# Patient Record
Sex: MALE | Race: WHITE | NOT HISPANIC OR LATINO | Employment: OTHER | URBAN - METROPOLITAN AREA
[De-identification: names, ages, dates, MRNs, and addresses within clinical notes are randomized per-mention and may not be internally consistent; named-entity substitution may affect disease eponyms.]

---

## 2018-11-15 ENCOUNTER — HOSPITAL ENCOUNTER (OUTPATIENT)
Facility: MEDICAL CENTER | Age: 83
End: 2018-11-15
Attending: NEUROLOGICAL SURGERY | Admitting: NEUROLOGICAL SURGERY
Payer: COMMERCIAL

## 2018-11-30 ENCOUNTER — HOSPITAL ENCOUNTER (OUTPATIENT)
Dept: RADIOLOGY | Facility: MEDICAL CENTER | Age: 83
End: 2018-11-30
Attending: NEUROLOGICAL SURGERY
Payer: COMMERCIAL

## 2018-11-30 DIAGNOSIS — Z01.810 PRE-OPERATIVE CARDIOVASCULAR EXAMINATION: ICD-10-CM

## 2018-11-30 DIAGNOSIS — Z01.811 PRE-OPERATIVE RESPIRATORY EXAMINATION: ICD-10-CM

## 2018-11-30 DIAGNOSIS — Z01.812 PRE-OPERATIVE LABORATORY EXAMINATION: ICD-10-CM

## 2018-11-30 LAB
ANION GAP SERPL CALC-SCNC: 7 MMOL/L (ref 0–11.9)
APPEARANCE UR: CLEAR
APTT PPP: 32.3 SEC (ref 24.7–36)
BASOPHILS # BLD AUTO: 0.9 % (ref 0–1.8)
BASOPHILS # BLD: 0.06 K/UL (ref 0–0.12)
BILIRUB UR QL STRIP.AUTO: NEGATIVE
BUN SERPL-MCNC: 28 MG/DL (ref 8–22)
CALCIUM SERPL-MCNC: 9.5 MG/DL (ref 8.5–10.5)
CHLORIDE SERPL-SCNC: 106 MMOL/L (ref 96–112)
CO2 SERPL-SCNC: 26 MMOL/L (ref 20–33)
COLOR UR: YELLOW
CREAT SERPL-MCNC: 1.56 MG/DL (ref 0.5–1.4)
EKG IMPRESSION: NORMAL
EOSINOPHIL # BLD AUTO: 0.26 K/UL (ref 0–0.51)
EOSINOPHIL NFR BLD: 4 % (ref 0–6.9)
ERYTHROCYTE [DISTWIDTH] IN BLOOD BY AUTOMATED COUNT: 51.2 FL (ref 35.9–50)
GLUCOSE SERPL-MCNC: 91 MG/DL (ref 65–99)
GLUCOSE UR STRIP.AUTO-MCNC: NEGATIVE MG/DL
HCT VFR BLD AUTO: 38.7 % (ref 42–52)
HGB BLD-MCNC: 12.9 G/DL (ref 14–18)
IMM GRANULOCYTES # BLD AUTO: 0.01 K/UL (ref 0–0.11)
IMM GRANULOCYTES NFR BLD AUTO: 0.2 % (ref 0–0.9)
INR PPP: 1.04 (ref 0.87–1.13)
KETONES UR STRIP.AUTO-MCNC: NEGATIVE MG/DL
LEUKOCYTE ESTERASE UR QL STRIP.AUTO: NEGATIVE
LYMPHOCYTES # BLD AUTO: 1.31 K/UL (ref 1–4.8)
LYMPHOCYTES NFR BLD: 20.4 % (ref 22–41)
MCH RBC QN AUTO: 32.3 PG (ref 27–33)
MCHC RBC AUTO-ENTMCNC: 33.3 G/DL (ref 33.7–35.3)
MCV RBC AUTO: 97 FL (ref 81.4–97.8)
MICRO URNS: NORMAL
MONOCYTES # BLD AUTO: 0.59 K/UL (ref 0–0.85)
MONOCYTES NFR BLD AUTO: 9.2 % (ref 0–13.4)
NEUTROPHILS # BLD AUTO: 4.19 K/UL (ref 1.82–7.42)
NEUTROPHILS NFR BLD: 65.3 % (ref 44–72)
NITRITE UR QL STRIP.AUTO: NEGATIVE
NRBC # BLD AUTO: 0 K/UL
NRBC BLD-RTO: 0 /100 WBC
PH UR STRIP.AUTO: 5.5 [PH]
PLATELET # BLD AUTO: 202 K/UL (ref 164–446)
PMV BLD AUTO: 9.6 FL (ref 9–12.9)
POTASSIUM SERPL-SCNC: 4.1 MMOL/L (ref 3.6–5.5)
PROT UR QL STRIP: NEGATIVE MG/DL
PROTHROMBIN TIME: 13.8 SEC (ref 12–14.6)
RBC # BLD AUTO: 3.99 M/UL (ref 4.7–6.1)
RBC UR QL AUTO: NEGATIVE
SODIUM SERPL-SCNC: 139 MMOL/L (ref 135–145)
SP GR UR STRIP.AUTO: 1.01
UROBILINOGEN UR STRIP.AUTO-MCNC: 0.2 MG/DL
WBC # BLD AUTO: 6.4 K/UL (ref 4.8–10.8)

## 2018-11-30 PROCEDURE — 81003 URINALYSIS AUTO W/O SCOPE: CPT

## 2018-11-30 PROCEDURE — 93005 ELECTROCARDIOGRAM TRACING: CPT

## 2018-11-30 PROCEDURE — 80048 BASIC METABOLIC PNL TOTAL CA: CPT

## 2018-11-30 PROCEDURE — 85730 THROMBOPLASTIN TIME PARTIAL: CPT

## 2018-11-30 PROCEDURE — 93010 ELECTROCARDIOGRAM REPORT: CPT | Performed by: INTERNAL MEDICINE

## 2018-11-30 PROCEDURE — 36415 COLL VENOUS BLD VENIPUNCTURE: CPT

## 2018-11-30 PROCEDURE — 85610 PROTHROMBIN TIME: CPT

## 2018-11-30 PROCEDURE — 85025 COMPLETE CBC W/AUTO DIFF WBC: CPT

## 2018-11-30 PROCEDURE — 71045 X-RAY EXAM CHEST 1 VIEW: CPT

## 2018-11-30 RX ORDER — HYDROCHLOROTHIAZIDE 12.5 MG/1
12.5 CAPSULE, GELATIN COATED ORAL DAILY
COMMUNITY
End: 2018-12-04 | Stop reason: HOSPADM

## 2018-12-06 ENCOUNTER — HOSPITAL ENCOUNTER (OUTPATIENT)
Dept: RADIOLOGY | Facility: MEDICAL CENTER | Age: 83
End: 2018-12-06

## 2018-12-14 ENCOUNTER — APPOINTMENT (OUTPATIENT)
Dept: RADIOLOGY | Facility: MEDICAL CENTER | Age: 83
End: 2018-12-14
Attending: INTERNAL MEDICINE
Payer: COMMERCIAL

## 2018-12-14 ENCOUNTER — HOSPITAL ENCOUNTER (OUTPATIENT)
Facility: MEDICAL CENTER | Age: 83
End: 2018-12-14
Attending: INTERNAL MEDICINE | Admitting: INTERNAL MEDICINE
Payer: COMMERCIAL

## 2018-12-14 ENCOUNTER — APPOINTMENT (OUTPATIENT)
Dept: RADIOLOGY | Facility: MEDICAL CENTER | Age: 83
End: 2018-12-14
Attending: RADIOLOGY
Payer: COMMERCIAL

## 2018-12-14 VITALS
BODY MASS INDEX: 24.91 KG/M2 | OXYGEN SATURATION: 96 % | WEIGHT: 168.21 LBS | RESPIRATION RATE: 14 BRPM | DIASTOLIC BLOOD PRESSURE: 73 MMHG | HEIGHT: 69 IN | SYSTOLIC BLOOD PRESSURE: 141 MMHG | HEART RATE: 60 BPM | TEMPERATURE: 97.4 F

## 2018-12-14 DIAGNOSIS — C34.11 MALIGNANT NEOPLASM OF UPPER LOBE OF RIGHT LUNG (HCC): ICD-10-CM

## 2018-12-14 LAB — PATHOLOGY CONSULT NOTE: NORMAL

## 2018-12-14 PROCEDURE — 71045 X-RAY EXAM CHEST 1 VIEW: CPT

## 2018-12-14 PROCEDURE — 88305 TISSUE EXAM BY PATHOLOGIST: CPT

## 2018-12-14 PROCEDURE — 160002 HCHG RECOVERY MINUTES (STAT)

## 2018-12-14 PROCEDURE — 700111 HCHG RX REV CODE 636 W/ 250 OVERRIDE (IP)

## 2018-12-14 PROCEDURE — 700101 HCHG RX REV CODE 250

## 2018-12-14 PROCEDURE — 99153 MOD SED SAME PHYS/QHP EA: CPT

## 2018-12-14 RX ORDER — MIDAZOLAM HYDROCHLORIDE 1 MG/ML
.5-2 INJECTION INTRAMUSCULAR; INTRAVENOUS PRN
Status: ACTIVE | OUTPATIENT
Start: 2018-12-14 | End: 2018-12-14

## 2018-12-14 RX ORDER — ONDANSETRON 2 MG/ML
4 INJECTION INTRAMUSCULAR; INTRAVENOUS PRN
Status: DISCONTINUED | OUTPATIENT
Start: 2018-12-14 | End: 2018-12-14 | Stop reason: HOSPADM

## 2018-12-14 RX ORDER — SODIUM CHLORIDE 9 MG/ML
500 INJECTION, SOLUTION INTRAVENOUS
Status: DISCONTINUED | OUTPATIENT
Start: 2018-12-14 | End: 2018-12-14 | Stop reason: HOSPADM

## 2018-12-14 RX ORDER — NALOXONE HYDROCHLORIDE 0.4 MG/ML
INJECTION, SOLUTION INTRAMUSCULAR; INTRAVENOUS; SUBCUTANEOUS
Status: COMPLETED
Start: 2018-12-14 | End: 2018-12-14

## 2018-12-14 RX ORDER — SODIUM CHLORIDE 9 MG/ML
500 INJECTION, SOLUTION INTRAVENOUS
Status: ACTIVE | OUTPATIENT
Start: 2018-12-14 | End: 2018-12-14

## 2018-12-14 RX ORDER — MIDAZOLAM HYDROCHLORIDE 1 MG/ML
.5-2 INJECTION INTRAMUSCULAR; INTRAVENOUS PRN
Status: DISCONTINUED | OUTPATIENT
Start: 2018-12-14 | End: 2018-12-14 | Stop reason: HOSPADM

## 2018-12-14 RX ORDER — ONDANSETRON 2 MG/ML
4 INJECTION INTRAMUSCULAR; INTRAVENOUS PRN
Status: ACTIVE | OUTPATIENT
Start: 2018-12-14 | End: 2018-12-14

## 2018-12-14 RX ORDER — MIDAZOLAM HYDROCHLORIDE 1 MG/ML
INJECTION INTRAMUSCULAR; INTRAVENOUS
Status: COMPLETED
Start: 2018-12-14 | End: 2018-12-14

## 2018-12-14 RX ADMIN — FENTANYL CITRATE 50 MCG: 50 INJECTION, SOLUTION INTRAMUSCULAR; INTRAVENOUS at 12:08

## 2018-12-14 RX ADMIN — LIDOCAINE HYDROCHLORIDE: 10 INJECTION, SOLUTION EPIDURAL; INFILTRATION; INTRACAUDAL; PERINEURAL at 12:09

## 2018-12-14 RX ADMIN — MIDAZOLAM 1 MG: 1 INJECTION INTRAMUSCULAR; INTRAVENOUS at 12:08

## 2018-12-14 RX ADMIN — MIDAZOLAM HYDROCHLORIDE 1 MG: 1 INJECTION INTRAMUSCULAR; INTRAVENOUS at 12:08

## 2018-12-14 ASSESSMENT — PAIN SCALES - GENERAL
PAINLEVEL_OUTOF10: 0

## 2018-12-14 NOTE — PROGRESS NOTES
History and Physical    Date: 12/14/2018    PCP: Sydney Partida M.D.      CC: Lung mass, hx lung ca    HPI: This is a 86 y.o. male who is presenting For CT guided R lung biopsy.     Past Medical History:   Diagnosis Date   • Arthritis     hips   • Cancer (HCC) 2015    lung- radiation and surgery   • Dental disorder     full dentures   • Hypertension    • Pain     back, hips, legs   • Pneumonia 2017   • Renal disorder     CKD stage 3   • Unspecified cataract     bilateral IOL       Past Surgical History:   Procedure Laterality Date   • THORACOSCOPY Right 7/22/2015    Procedure: THORACOSCOPY WEDGE RESECTION UPPER LOBE LUNG NODULE, ADHESION OF LYSIS;  Surgeon: Yobany Guerra M.D.;  Location: SURGERY [facility];  Service:    • THORACOTOMY Right 7/22/2015    Procedure: THORACOTOMY With Wedge Resection upper Lobe Lung Nodule, Adhesion of Lysis;  Surgeon: Yobany Guerra M.D.;  Location: SURGERY [facility];  Service:    • OTHER      mayco cataracts       Current Facility-Administered Medications   Medication Dose Route Frequency Provider Last Rate Last Dose   • LIDOCAINE HCL 1% (S-ORDERABLE)                 Social History     Social History   • Marital status:      Spouse name: N/A   • Number of children: N/A   • Years of education: N/A     Occupational History   • Not on file.     Social History Main Topics   • Smoking status: Current Every Day Smoker     Packs/day: 0.25     Years: 60.00     Types: Cigarettes     Last attempt to quit: 6/22/2015   • Smokeless tobacco: Never Used   • Alcohol use No   • Drug use: No   • Sexual activity: Not on file     Other Topics Concern   • Not on file     Social History Narrative   • No narrative on file       History reviewed. No pertinent family history.    Allergies:  Patient has no known allergies.    Review of Systems:  Negative     Physical Exam    Vital Signs  Blood Pressure : 135/60   Temperature: 36.6 °C (97.8 °F)   Pulse: 64   Respiration: 18    Pulse Oximetry: 100 %        Labs:                    Radiology:  CT-NEEDLE BX-LUNG-MEDIASTINUM RIGHT    (Results Pending)             Assessment and Plan:This is a 86 y.o. Male with R lung mass and hx of lung cancer. CT guided R lung biopsy will be performed.

## 2018-12-14 NOTE — DISCHARGE INSTRUCTIONS
ACTIVITY: Rest and take it easy for the first 24 hours.  A responsible adult is recommended to remain with you during that time.  It is normal to feel sleepy.  We encourage you to not do anything that requires balance, judgment or coordination.    MILD FLU-LIKE SYMPTOMS ARE NORMAL. YOU MAY EXPERIENCE GENERALIZED MUSCLE ACHES, THROAT IRRITATION, HEADACHE AND/OR SOME NAUSEA.    FOR 24 HOURS DO NOT:  Drive, operate machinery or run household appliances.  Drink beer or alcoholic beverages.   Make important decisions or sign legal documents.      DIET: To avoid nausea, slowly advance diet as tolerated, avoiding spicy or greasy foods for the first day.  Add more substantial food to your diet according to your physician's instructions.  Babies can be fed formula or breast milk as soon as they are hungry.  INCREASE FLUIDS AND FIBER TO AVOID CONSTIPATION.    SURGICAL DRESSING/BATHING:       Keep the dressing clean and dry for 2 days.  May remove dressing in 2 days  and can shower.  Do not submerge site under water for 1 week.  No heavy lifting and limit movement for 2 days.        FOLLOW-UP APPOINTMENT:  A follow-up appointment should be arranged with your doctor ; call to schedule.    You should CALL YOUR PHYSICIAN if you develop:  Fever greater than 101 degrees F.  Pain not relieved by medication, or persistent nausea or vomiting.  Excessive bleeding (blood soaking through dressing) or unexpected drainage from the wound.  Extreme redness or swelling around the incision site, drainage of pus or foul smelling drainage.  Inability to urinate or empty your bladder within 8 hours.  Problems with breathing or chest pain.    You should call 911 if you develop problems with breathing or chest pain.  If you are unable to contact your doctor or surgical center, you should go to the nearest emergency room or urgent care center.      Physician's telephone #: [phone]    If any questions arise, call your doctor.  If your doctor is  not available, please feel free to call the Surgical Center at [phone].  The Center is open Monday through Friday from 7AM to 7PM.  You can also call the HEALTH HOTLINE open 24 hours/day, 7 days/week and speak to a nurse at [phone], or toll free at [phone].    A registered nurse may call you a few days after your surgery to see how you are doing after your procedure.    MEDICATIONS: Resume taking daily medication.  Take prescribed pain medication with food.  If no medication is prescribed, you may take non-aspirin pain medication if needed.  PAIN MEDICATION CAN BE VERY CONSTIPATING.  Take a stool softener or laxative such as senokot, pericolace, or milk of magnesia if needed.      If your physician has prescribed pain medication that includes Acetaminophen (Tylenol), do not take additional Acetaminophen (Tylenol) while taking the prescribed medication.    Depression / Suicide Risk    As you are discharged from this [facility] facility, it is important to learn how to keep safe from harming yourself.    Recognize the warning signs:  · Abrupt changes in personality, positive or negative- including increase in energy   · Giving away possessions  · Change in eating patterns- significant weight changes-  positive or negative  · Change in sleeping patterns- unable to sleep or sleeping all the time   · Unwillingness or inability to communicate  · Depression  · Unusual sadness, discouragement and loneliness  · Talk of wanting to die  · Neglect of personal appearance   · Rebelliousness- reckless behavior  · Withdrawal from people/activities they love  · Confusion- inability to concentrate     If you or a loved one observes any of these behaviors or has concerns about self-harm, here's what you can do:  · Talk about it- your feelings and reasons for harming yourself  · Remove any means that you might use to hurt yourself (examples: pills, rope, extension cords, firearm)  · Get professional help from the  community (Mental Health, Substance Abuse, psychological counseling)  · Do not be alone:Call your Safe Contact- someone whom you trust who will be there for you.  · Call your local CRISIS HOTLINE 962-1638 or 481-514-4088  · Call your local Children's Mobile Crisis Response Team [place] (356) 761-5504 or www.ShopYourWorld  · Call the toll free National Suicide Prevention Hotlines   · National Suicide Prevention Lifeline 079-165-DICI (2168)  · [facility] Line Network 800-SUICIDE (304-3717)                      Lung Biopsy    A lung biopsy is a procedure in which a tissue sample is removed from the lung. The tissue can be examined under a microscope to help diagnose various lung disorders.   LET YOUR HEALTH CARE PROVIDER KNOW ABOUT:  · Any allergies you have.  · All medicines you are taking, including vitamins, herbs, eye drops, creams, and over-the-counter medicines.  · Previous problems you or members of your family have had with the use of anesthetics.  · Any blood disorders or bleeding problems that you have.  · Previous surgeries you have had.  · Medical conditions you have.  RISKS AND COMPLICATIONS  Generally, a lung biopsy is a safe procedure. However, problems can occur and include:  · Collapse of the lung.    · Bleeding.    · Infection.    BEFORE THE PROCEDURE  · Do not eat or drink anything after midnight on the night before the procedure or as directed by your health care provider.  · Ask your health care provider about changing or stopping your regular medicines. This is especially important if you are taking diabetes medicines or blood thinners.  · Plan to have someone take you home after the procedure.  PROCEDURE  Various methods can be used to perform a lung biopsy:   · Needle biopsy. A biopsy needle is inserted into the lung. The needle is used to collect the tissue sample. A CT scanner may be used to guide the needle to the right place in the lung. For this method, a medicine is used to  numb the area where the biopsy sample will be taken (local anesthetic).  · Bronchoscopy. A flexible tube (bronchoscope) is inserted into your lungs by going through your mouth or nose. A needle or forceps is passed through the bronchoscope to remove the tissue sample. For this method, medicine may be used to numb the back of your throat.  · Open biopsy. A cut (incision) is made in your chest. The tissue sample is then removed using surgical tools. The incision is closed with skin glue, skin adhesive strips, or stitches. For this method, you will be given medicine to make you sleep through the procedure (general anesthetic).  AFTER THE PROCEDURE  · Your recovery will be assessed and monitored.  · You might have soreness and tenderness at the site of the biopsy for a few days after the procedure.  · You might have a cough and some soreness in your throat for a few days if a bronchoscope was used.  This information is not intended to replace advice given to you by your health care provider. Make sure you discuss any questions you have with your health care provider.  Document Released: 03/08/2006 Document Revised: 01/08/2016 Document Reviewed: 06/01/2014  Elsevier Interactive Patient Education © 2017 [employer]

## 2018-12-14 NOTE — PROGRESS NOTES
CT Nursing Note:    Site Marked and Confirmed with MD, patient and RN pre procedure. Dr. Oneil performed Right Lung Biopsy.  Cores x 4 in formalin taken to pathology by RT Jose Raul.  The pt tolerated the procedure well; ETCo2 baseline 31, with consistent waveform during the procedure.  Gauze and tegaderm applied to posterior right back, CDI and soft.  Pt alert and verbally appropriate post procedure, vital signs stable during procedure and transport, see flow sheet for vital signs.  Report given to ALTON Busby.  RN transported pt to PPU with Sao2 monitor.      Procedure End Time: 1221    Sedation End Time: 1225

## 2018-12-14 NOTE — OR NURSING
1251: [facility]. Patient is awake VSS. R posterior upper back  site soft and dressing clean, dry and intact. No c/o pain or nausea at this time. Will monitor closely. Vital signs per MD order. Patient's respiration spontaneous and non-labored.   1410: 1st CXR done at bedside. Wife at bedside. VSS.   1435: 1st CXR : no pneumothorax. Patient ambulated to and from the bathroom without a problem.   1540: VSS. Biopsy site intact.   1615: 2ND CXR done at bedside.

## 2018-12-14 NOTE — OR SURGEON
Immediate Post- Operative Note        PostOp Diagnosis: R lung mass. Hx of lung cancer.       Procedure(s): CT guided lung biopsy.      Estimated Blood Loss: Less than 5 ml        Complications: None            12/14/2018     1225 PM     Ari Oneil

## 2018-12-15 NOTE — OR NURSING
1715: DC instructions given. Questions answered. Wife at bedside. G tube site soft,CDI. No c/o pain or nausea. Patient wide awake. VSS. Patient met criteria for discharge.

## 2019-12-03 ENCOUNTER — HOSPITAL ENCOUNTER (EMERGENCY)
Dept: HOSPITAL 8 - ED | Age: 84
Discharge: HOME | End: 2019-12-03
Payer: MEDICARE

## 2019-12-03 VITALS — SYSTOLIC BLOOD PRESSURE: 120 MMHG | DIASTOLIC BLOOD PRESSURE: 78 MMHG

## 2019-12-03 VITALS — BODY MASS INDEX: 20.06 KG/M2 | HEIGHT: 71 IN | WEIGHT: 143.3 LBS

## 2019-12-03 DIAGNOSIS — N30.00: Primary | ICD-10-CM

## 2019-12-03 DIAGNOSIS — Z85.118: ICD-10-CM

## 2019-12-03 DIAGNOSIS — R07.89: ICD-10-CM

## 2019-12-03 DIAGNOSIS — R33.9: ICD-10-CM

## 2019-12-03 LAB
ALBUMIN SERPL-MCNC: 2.5 G/DL (ref 3.4–5)
ALP SERPL-CCNC: 80 U/L (ref 45–117)
ALT SERPL-CCNC: 21 U/L (ref 12–78)
ANION GAP SERPL CALC-SCNC: 6 MMOL/L (ref 5–15)
BASOPHILS # BLD AUTO: 0.06 X10^3/UL (ref 0–0.1)
BASOPHILS NFR BLD AUTO: 1 % (ref 0–1)
BILIRUB SERPL-MCNC: 0.5 MG/DL (ref 0.2–1)
CALCIUM SERPL-MCNC: 9.3 MG/DL (ref 8.5–10.1)
CHLORIDE SERPL-SCNC: 111 MMOL/L (ref 98–107)
CREAT SERPL-MCNC: 1.92 MG/DL (ref 0.7–1.3)
CULTURE INDICATED?: YES
EOSINOPHIL # BLD AUTO: 0.16 X10^3/UL (ref 0–0.4)
EOSINOPHIL NFR BLD AUTO: 3 % (ref 1–7)
ERYTHROCYTE [DISTWIDTH] IN BLOOD BY AUTOMATED COUNT: 15.4 % (ref 9.4–14.8)
LYMPHOCYTES # BLD AUTO: 0.77 X10^3/UL (ref 1–3.4)
LYMPHOCYTES NFR BLD AUTO: 15 % (ref 22–44)
MCH RBC QN AUTO: 32 PG (ref 27.5–34.5)
MCHC RBC AUTO-ENTMCNC: 33.3 G/DL (ref 33.2–36.2)
MCV RBC AUTO: 96.2 FL (ref 81–97)
MD: NO
MICROSCOPIC: (no result)
MONOCYTES # BLD AUTO: 0.29 X10^3/UL (ref 0.2–0.8)
MONOCYTES NFR BLD AUTO: 6 % (ref 2–9)
NEUTROPHILS # BLD AUTO: 4.03 X10^3/UL (ref 1.8–6.8)
NEUTROPHILS NFR BLD AUTO: 76 % (ref 42–75)
PLATELET # BLD AUTO: 304 X10^3/UL (ref 130–400)
PMV BLD AUTO: 7.7 FL (ref 7.4–10.4)
PROT SERPL-MCNC: 7.3 G/DL (ref 6.4–8.2)
RBC # BLD AUTO: 3.6 X10^6/UL (ref 4.38–5.82)
TROPONIN I SERPL-MCNC: < 0.015 NG/ML (ref 0–0.04)

## 2019-12-03 PROCEDURE — 70450 CT HEAD/BRAIN W/O DYE: CPT

## 2019-12-03 PROCEDURE — 85025 COMPLETE CBC W/AUTO DIFF WBC: CPT

## 2019-12-03 PROCEDURE — 36415 COLL VENOUS BLD VENIPUNCTURE: CPT

## 2019-12-03 PROCEDURE — 81001 URINALYSIS AUTO W/SCOPE: CPT

## 2019-12-03 PROCEDURE — 51702 INSERT TEMP BLADDER CATH: CPT

## 2019-12-03 PROCEDURE — 87086 URINE CULTURE/COLONY COUNT: CPT

## 2019-12-03 PROCEDURE — 71046 X-RAY EXAM CHEST 2 VIEWS: CPT

## 2019-12-03 PROCEDURE — 99284 EMERGENCY DEPT VISIT MOD MDM: CPT

## 2019-12-03 PROCEDURE — 93005 ELECTROCARDIOGRAM TRACING: CPT

## 2019-12-03 PROCEDURE — 72125 CT NECK SPINE W/O DYE: CPT

## 2019-12-03 PROCEDURE — 84484 ASSAY OF TROPONIN QUANT: CPT

## 2019-12-03 PROCEDURE — 74176 CT ABD & PELVIS W/O CONTRAST: CPT

## 2019-12-03 PROCEDURE — 80053 COMPREHEN METABOLIC PANEL: CPT

## 2019-12-03 NOTE — NUR
-------------------------------------------------------------------------------

          *** Note davinone in EDM - 12/03/19 at 1826 by ANISH ***           

-------------------------------------------------------------------------------

PT. REMAINS MONITORED WITH SEIZURE PRECAUTIONS IN PLACE.  PT. FINISHED HIS 
SNACK AND HIS REPEAT BDK=[id number].  PT. HAS NO CONCERNS AT THIS TIME.  VSS.

## 2019-12-03 NOTE — NUR
PT.'S EASTMAN CATH WAS CHANGED TO A LEG BAG. VSS.  PT. AND HIS FAMILY WERE GIVEN 
DISCHARGE INSTRUCTIONS AND A SCRIPT.  UNDERSTANDING WAS VERBALIZED ALONG WITH 
WILLINGNESS TO COMPLY.  PT.'S IV WAS DCD', CATH TIP INTACT PRESSURE HELD WITH 
HEMOSTASIS ACHIEVED.  PT. WAS ASSISTED WITH DRESSING AND TAKEN TO THE DISCHARGE 
DESK IN A WHEELCHAIR WITH HIS FAMILY AT HIS SIDE.

## 2019-12-03 NOTE — NUR
PT. IS A & O X 4 WITH C/O INCREASING FALLS AND WEAKNESS OVER THE PAST 5 DAYS.  
PT. IS PINK, WARM AND DRY.  LUNGS ARE DIMINISHED IN THE BASES.  PCXR WAS DONE 
AND NEW ABNORMALITIES WERE FOUND.  PT. FELL AND HIT HIS RIBS, NO FRACTURES 
NOTED.  PT. DOES HAVE A HX OF LUNGS CA.  RESP ARE EUPNEIC AND SATS % ON 
ROOM AIR.  PT. DENIES C/O PAIN AT THIS TIME.  PT. STATES HE DOES HAVE URINARY 
RETENTION.  PT.'S ABD. IS SOFT AND ROUND WITH BS + X 4 QUADS.  MM ARE PINK AND 
MOIST WITH PULSES +2 THROUGHOUT.  PT. DIMAS WNL.   LES DIEGO PLACED A EASTMAN CATH 
PER MD ORDERS.  PT. HAS THE CP MONITOR IN PLACE AND IV ACCESS WAS ESTABLISHED.  
SIDERAILS REMAIN UP X 2 WITH THE CALL LIGHT IN PLACE.  PT. WIFE IS AT THE 
BEDSIDE.

## 2020-08-07 ENCOUNTER — HOSPITAL ENCOUNTER (OUTPATIENT)
Dept: HOSPITAL 8 - STAR | Age: 85
Discharge: HOME | End: 2020-08-07
Attending: NEUROLOGICAL SURGERY
Payer: MEDICARE

## 2020-08-07 DIAGNOSIS — M48.062: ICD-10-CM

## 2020-08-07 DIAGNOSIS — I45.10: ICD-10-CM

## 2020-08-07 DIAGNOSIS — Z01.818: Primary | ICD-10-CM

## 2020-08-07 DIAGNOSIS — M47.816: ICD-10-CM

## 2020-08-07 LAB
ALBUMIN SERPL-MCNC: 3.3 G/DL (ref 3.4–5)
ALP SERPL-CCNC: 91 U/L (ref 45–117)
ALT SERPL-CCNC: 12 U/L (ref 12–78)
ANION GAP SERPL CALC-SCNC: 4 MMOL/L (ref 5–15)
APTT BLD: 28 SECONDS (ref 25–31)
BASOPHILS # BLD AUTO: 0.06 X10^3/UL (ref 0–0.1)
BASOPHILS NFR BLD AUTO: 1 % (ref 0–1)
BILIRUB SERPL-MCNC: 0.4 MG/DL (ref 0.2–1)
CALCIUM SERPL-MCNC: 8.8 MG/DL (ref 8.5–10.1)
CHLORIDE SERPL-SCNC: 110 MMOL/L (ref 98–107)
CREAT SERPL-MCNC: 1.69 MG/DL (ref 0.7–1.3)
EOSINOPHIL # BLD AUTO: 0.21 X10^3/UL (ref 0–0.4)
EOSINOPHIL NFR BLD AUTO: 3 % (ref 1–7)
ERYTHROCYTE [DISTWIDTH] IN BLOOD BY AUTOMATED COUNT: 15.4 % (ref 9.4–14.8)
INR PPP: 0.97 (ref 0.93–1.1)
LYMPHOCYTES # BLD AUTO: 1.31 X10^3/UL (ref 1–3.4)
LYMPHOCYTES NFR BLD AUTO: 20 % (ref 22–44)
MCH RBC QN AUTO: 32 PG (ref 27.5–34.5)
MCHC RBC AUTO-ENTMCNC: 33.2 G/DL (ref 33.2–36.2)
MCV RBC AUTO: 96.4 FL (ref 81–97)
MD: NO
MICROSCOPIC: (no result)
MONOCYTES # BLD AUTO: 0.56 X10^3/UL (ref 0.2–0.8)
MONOCYTES NFR BLD AUTO: 9 % (ref 2–9)
NEUTROPHILS # BLD AUTO: 4.44 X10^3/UL (ref 1.8–6.8)
NEUTROPHILS NFR BLD AUTO: 67 % (ref 42–75)
PLATELET # BLD AUTO: 214 X10^3/UL (ref 130–400)
PMV BLD AUTO: 8.2 FL (ref 7.4–10.4)
PROT SERPL-MCNC: 7.3 G/DL (ref 6.4–8.2)
PROTHROMBIN TIME: 10 SECONDS (ref 9.6–11.5)
RBC # BLD AUTO: 3.42 X10^6/UL (ref 4.38–5.82)

## 2020-08-07 PROCEDURE — 80053 COMPREHEN METABOLIC PANEL: CPT

## 2020-08-07 PROCEDURE — 36415 COLL VENOUS BLD VENIPUNCTURE: CPT

## 2020-08-07 PROCEDURE — 85610 PROTHROMBIN TIME: CPT

## 2020-08-07 PROCEDURE — 93005 ELECTROCARDIOGRAM TRACING: CPT

## 2020-08-07 PROCEDURE — 85730 THROMBOPLASTIN TIME PARTIAL: CPT

## 2020-08-07 PROCEDURE — 72110 X-RAY EXAM L-2 SPINE 4/>VWS: CPT

## 2020-08-07 PROCEDURE — 81003 URINALYSIS AUTO W/O SCOPE: CPT

## 2020-08-07 PROCEDURE — 71046 X-RAY EXAM CHEST 2 VIEWS: CPT

## 2020-08-07 PROCEDURE — 85025 COMPLETE CBC W/AUTO DIFF WBC: CPT

## 2020-08-14 ENCOUNTER — HOSPITAL ENCOUNTER (OUTPATIENT)
Dept: HOSPITAL 8 - STAR | Age: 85
Discharge: HOME | End: 2020-08-14
Payer: MEDICARE

## 2020-08-14 DIAGNOSIS — Z11.59: ICD-10-CM

## 2020-08-14 DIAGNOSIS — Z01.818: Primary | ICD-10-CM

## 2020-08-14 PROCEDURE — 87635 SARS-COV-2 COVID-19 AMP PRB: CPT

## 2020-08-14 PROCEDURE — 36415 COLL VENOUS BLD VENIPUNCTURE: CPT

## 2020-08-18 ENCOUNTER — HOSPITAL ENCOUNTER (OUTPATIENT)
Dept: HOSPITAL 8 - OUT | Age: 85
Setting detail: OBSERVATION
LOS: 2 days | Discharge: HOME | End: 2020-08-20
Attending: NEUROLOGICAL SURGERY | Admitting: NEUROLOGICAL SURGERY
Payer: MEDICARE

## 2020-08-18 VITALS — SYSTOLIC BLOOD PRESSURE: 122 MMHG | DIASTOLIC BLOOD PRESSURE: 56 MMHG

## 2020-08-18 VITALS — DIASTOLIC BLOOD PRESSURE: 54 MMHG | SYSTOLIC BLOOD PRESSURE: 119 MMHG

## 2020-08-18 VITALS — BODY MASS INDEX: 23.98 KG/M2 | WEIGHT: 171.3 LBS | HEIGHT: 71 IN

## 2020-08-18 DIAGNOSIS — F17.200: ICD-10-CM

## 2020-08-18 DIAGNOSIS — J44.9: ICD-10-CM

## 2020-08-18 DIAGNOSIS — I71.4: ICD-10-CM

## 2020-08-18 DIAGNOSIS — Z85.118: ICD-10-CM

## 2020-08-18 DIAGNOSIS — Z79.899: ICD-10-CM

## 2020-08-18 DIAGNOSIS — I10: ICD-10-CM

## 2020-08-18 DIAGNOSIS — E78.00: ICD-10-CM

## 2020-08-18 DIAGNOSIS — M48.062: Primary | ICD-10-CM

## 2020-08-18 PROCEDURE — 97162 PT EVAL MOD COMPLEX 30 MIN: CPT

## 2020-08-18 PROCEDURE — 72100 X-RAY EXAM L-S SPINE 2/3 VWS: CPT

## 2020-08-18 PROCEDURE — 97165 OT EVAL LOW COMPLEX 30 MIN: CPT

## 2020-08-18 PROCEDURE — 96365 THER/PROPH/DIAG IV INF INIT: CPT

## 2020-08-18 PROCEDURE — 96361 HYDRATE IV INFUSION ADD-ON: CPT

## 2020-08-18 PROCEDURE — 63047 LAM FACETEC & FORAMOT LUMBAR: CPT

## 2020-08-18 PROCEDURE — 96366 THER/PROPH/DIAG IV INF ADDON: CPT

## 2020-08-18 PROCEDURE — G0378 HOSPITAL OBSERVATION PER HR: HCPCS

## 2020-08-18 PROCEDURE — 63048 LAM FACETEC &FORAMOT EA ADDL: CPT

## 2020-08-18 RX ADMIN — SODIUM CHLORIDE AND POTASSIUM CHLORIDE SCH MLS/HR: .9; .15 SOLUTION INTRAVENOUS at 22:52

## 2020-08-19 VITALS — DIASTOLIC BLOOD PRESSURE: 59 MMHG | SYSTOLIC BLOOD PRESSURE: 128 MMHG

## 2020-08-19 VITALS — SYSTOLIC BLOOD PRESSURE: 119 MMHG | DIASTOLIC BLOOD PRESSURE: 53 MMHG

## 2020-08-19 VITALS — DIASTOLIC BLOOD PRESSURE: 90 MMHG | SYSTOLIC BLOOD PRESSURE: 134 MMHG

## 2020-08-19 VITALS — DIASTOLIC BLOOD PRESSURE: 61 MMHG | SYSTOLIC BLOOD PRESSURE: 105 MMHG

## 2020-08-19 RX ADMIN — HYDROCHLOROTHIAZIDE SCH MG: 12.5 CAPSULE ORAL at 09:18

## 2020-08-19 RX ADMIN — HYDROCODONE BITARTRATE AND ACETAMINOPHEN PRN TAB: 10; 325 TABLET ORAL at 09:18

## 2020-08-19 RX ADMIN — AMLODIPINE BESYLATE SCH MG: 5 TABLET ORAL at 09:17

## 2020-08-19 RX ADMIN — DOCUSATE SODIUM 50MG AND SENNOSIDES 8.6MG SCH TAB: 8.6; 5 TABLET, FILM COATED ORAL at 09:17

## 2020-08-19 RX ADMIN — CEFAZOLIN SODIUM SCH MLS/HR: 1 SOLUTION INTRAVENOUS at 09:20

## 2020-08-19 RX ADMIN — HYDROCODONE BITARTRATE AND ACETAMINOPHEN PRN TAB: 10; 325 TABLET ORAL at 13:24

## 2020-08-19 RX ADMIN — SODIUM CHLORIDE AND POTASSIUM CHLORIDE SCH MLS/HR: .9; .15 SOLUTION INTRAVENOUS at 10:09

## 2020-08-19 RX ADMIN — HYDROCODONE BITARTRATE AND ACETAMINOPHEN PRN TAB: 10; 325 TABLET ORAL at 18:36

## 2020-08-19 RX ADMIN — ROPINIROLE HYDROCHLORIDE SCH MG: 0.5 TABLET, FILM COATED ORAL at 09:17

## 2020-08-19 RX ADMIN — SODIUM CHLORIDE AND POTASSIUM CHLORIDE SCH MLS/HR: .9; .15 SOLUTION INTRAVENOUS at 19:30

## 2020-08-19 RX ADMIN — CEFAZOLIN SODIUM SCH MLS/HR: 1 SOLUTION INTRAVENOUS at 00:47

## 2020-08-19 RX ADMIN — LISINOPRIL SCH MG: 20 TABLET ORAL at 09:17

## 2020-08-20 VITALS — SYSTOLIC BLOOD PRESSURE: 99 MMHG | DIASTOLIC BLOOD PRESSURE: 51 MMHG

## 2020-08-20 VITALS — SYSTOLIC BLOOD PRESSURE: 106 MMHG | DIASTOLIC BLOOD PRESSURE: 55 MMHG

## 2020-08-20 VITALS — SYSTOLIC BLOOD PRESSURE: 115 MMHG | DIASTOLIC BLOOD PRESSURE: 57 MMHG

## 2020-08-20 RX ADMIN — DOCUSATE SODIUM 50MG AND SENNOSIDES 8.6MG SCH TAB: 8.6; 5 TABLET, FILM COATED ORAL at 08:56

## 2020-08-20 RX ADMIN — AMLODIPINE BESYLATE SCH MG: 5 TABLET ORAL at 08:58

## 2020-08-20 RX ADMIN — HYDROCHLOROTHIAZIDE SCH MG: 12.5 CAPSULE ORAL at 08:56

## 2020-08-20 RX ADMIN — HYDROCODONE BITARTRATE AND ACETAMINOPHEN PRN TAB: 10; 325 TABLET ORAL at 00:28

## 2020-08-20 RX ADMIN — ROPINIROLE HYDROCHLORIDE SCH MG: 0.5 TABLET, FILM COATED ORAL at 08:57

## 2020-08-20 RX ADMIN — LISINOPRIL SCH MG: 20 TABLET ORAL at 08:57

## 2020-08-20 RX ADMIN — SODIUM CHLORIDE AND POTASSIUM CHLORIDE SCH MLS/HR: .9; .15 SOLUTION INTRAVENOUS at 05:10

## 2020-08-20 RX ADMIN — HYDROCODONE BITARTRATE AND ACETAMINOPHEN PRN TAB: 10; 325 TABLET ORAL at 06:23

## 2020-10-06 ENCOUNTER — HOSPITAL ENCOUNTER (INPATIENT)
Dept: HOSPITAL 8 - ED | Age: 85
LOS: 4 days | Discharge: HOME | DRG: 377 | End: 2020-10-10
Attending: FAMILY MEDICINE | Admitting: FAMILY MEDICINE
Payer: MEDICARE

## 2020-10-06 VITALS — SYSTOLIC BLOOD PRESSURE: 125 MMHG | DIASTOLIC BLOOD PRESSURE: 47 MMHG

## 2020-10-06 VITALS — DIASTOLIC BLOOD PRESSURE: 65 MMHG | SYSTOLIC BLOOD PRESSURE: 172 MMHG

## 2020-10-06 VITALS — SYSTOLIC BLOOD PRESSURE: 168 MMHG | DIASTOLIC BLOOD PRESSURE: 79 MMHG

## 2020-10-06 VITALS — DIASTOLIC BLOOD PRESSURE: 53 MMHG | SYSTOLIC BLOOD PRESSURE: 129 MMHG

## 2020-10-06 VITALS — SYSTOLIC BLOOD PRESSURE: 108 MMHG | DIASTOLIC BLOOD PRESSURE: 45 MMHG

## 2020-10-06 VITALS — SYSTOLIC BLOOD PRESSURE: 138 MMHG | DIASTOLIC BLOOD PRESSURE: 56 MMHG

## 2020-10-06 VITALS — DIASTOLIC BLOOD PRESSURE: 46 MMHG | SYSTOLIC BLOOD PRESSURE: 116 MMHG

## 2020-10-06 VITALS — DIASTOLIC BLOOD PRESSURE: 54 MMHG | SYSTOLIC BLOOD PRESSURE: 145 MMHG

## 2020-10-06 VITALS — DIASTOLIC BLOOD PRESSURE: 50 MMHG | SYSTOLIC BLOOD PRESSURE: 121 MMHG

## 2020-10-06 VITALS — SYSTOLIC BLOOD PRESSURE: 145 MMHG | DIASTOLIC BLOOD PRESSURE: 54 MMHG

## 2020-10-06 VITALS — BODY MASS INDEX: 22.9 KG/M2 | WEIGHT: 163.58 LBS | HEIGHT: 71 IN

## 2020-10-06 VITALS — SYSTOLIC BLOOD PRESSURE: 136 MMHG | DIASTOLIC BLOOD PRESSURE: 68 MMHG

## 2020-10-06 VITALS — DIASTOLIC BLOOD PRESSURE: 55 MMHG | SYSTOLIC BLOOD PRESSURE: 133 MMHG

## 2020-10-06 VITALS — SYSTOLIC BLOOD PRESSURE: 138 MMHG | DIASTOLIC BLOOD PRESSURE: 60 MMHG

## 2020-10-06 DIAGNOSIS — I48.0: ICD-10-CM

## 2020-10-06 DIAGNOSIS — N17.0: ICD-10-CM

## 2020-10-06 DIAGNOSIS — Z85.118: ICD-10-CM

## 2020-10-06 DIAGNOSIS — E43: ICD-10-CM

## 2020-10-06 DIAGNOSIS — J44.9: ICD-10-CM

## 2020-10-06 DIAGNOSIS — R33.8: ICD-10-CM

## 2020-10-06 DIAGNOSIS — N40.1: ICD-10-CM

## 2020-10-06 DIAGNOSIS — E87.2: ICD-10-CM

## 2020-10-06 DIAGNOSIS — I10: ICD-10-CM

## 2020-10-06 DIAGNOSIS — G89.29: ICD-10-CM

## 2020-10-06 DIAGNOSIS — Z20.828: ICD-10-CM

## 2020-10-06 DIAGNOSIS — G25.81: ICD-10-CM

## 2020-10-06 DIAGNOSIS — E03.9: ICD-10-CM

## 2020-10-06 DIAGNOSIS — D62: ICD-10-CM

## 2020-10-06 DIAGNOSIS — K57.31: Primary | ICD-10-CM

## 2020-10-06 DIAGNOSIS — M54.9: ICD-10-CM

## 2020-10-06 DIAGNOSIS — F17.200: ICD-10-CM

## 2020-10-06 LAB
ALBUMIN SERPL-MCNC: 2.5 G/DL (ref 3.4–5)
ALP SERPL-CCNC: 78 U/L (ref 45–117)
ALT SERPL-CCNC: 10 U/L (ref 12–78)
ANION GAP SERPL CALC-SCNC: 5 MMOL/L (ref 5–15)
APTT BLD: 29 SECONDS (ref 25–31)
BASOPHILS # BLD AUTO: 0 X10^3/UL (ref 0–0.1)
BASOPHILS NFR BLD AUTO: 1 % (ref 0–1)
BILIRUB SERPL-MCNC: 0.1 MG/DL (ref 0.2–1)
CALCIUM SERPL-MCNC: 8.3 MG/DL (ref 8.5–10.1)
CHLORIDE SERPL-SCNC: 120 MMOL/L (ref 98–107)
CREAT SERPL-MCNC: 1.93 MG/DL (ref 0.7–1.3)
EOSINOPHIL # BLD AUTO: 0.1 X10^3/UL (ref 0–0.4)
EOSINOPHIL NFR BLD AUTO: 4 % (ref 1–7)
ERYTHROCYTE [DISTWIDTH] IN BLOOD BY AUTOMATED COUNT: 16.9 % (ref 9.4–14.8)
INR PPP: 1.07 (ref 0.93–1.1)
LYMPHOCYTES # BLD AUTO: 0.8 X10^3/UL (ref 1–3.4)
LYMPHOCYTES NFR BLD AUTO: 20 % (ref 22–44)
MCH RBC QN AUTO: 30.3 PG (ref 27.5–34.5)
MCHC RBC AUTO-ENTMCNC: 30.8 G/DL (ref 33.2–36.2)
MD: (no result)
MONOCYTES # BLD AUTO: 0.4 X10^3/UL (ref 0.2–0.8)
MONOCYTES NFR BLD AUTO: 9 % (ref 2–9)
NEUTROPHILS # BLD AUTO: 2.5 X10^3/UL (ref 1.8–6.8)
NEUTROPHILS NFR BLD AUTO: 66 % (ref 42–75)
PLATELET # BLD AUTO: 221 X10^3/UL (ref 130–400)
PMV BLD AUTO: 7.9 FL (ref 7.4–10.4)
PROT SERPL-MCNC: 5.8 G/DL (ref 6.4–8.2)
PROTHROMBIN TIME: 11 SECONDS (ref 9.6–11.5)
RBC # BLD AUTO: 1.48 X10^6/UL (ref 4.38–5.82)

## 2020-10-06 PROCEDURE — 85610 PROTHROMBIN TIME: CPT

## 2020-10-06 PROCEDURE — A9540 TC99M MAA: HCPCS

## 2020-10-06 PROCEDURE — A9558 XE133 XENON 10MCI: HCPCS

## 2020-10-06 PROCEDURE — 83735 ASSAY OF MAGNESIUM: CPT

## 2020-10-06 PROCEDURE — 85025 COMPLETE CBC W/AUTO DIFF WBC: CPT

## 2020-10-06 PROCEDURE — 85379 FIBRIN DEGRADATION QUANT: CPT

## 2020-10-06 PROCEDURE — 88305 TISSUE EXAM BY PATHOLOGIST: CPT

## 2020-10-06 PROCEDURE — 94640 AIRWAY INHALATION TREATMENT: CPT

## 2020-10-06 PROCEDURE — 85014 HEMATOCRIT: CPT

## 2020-10-06 PROCEDURE — 99285 EMERGENCY DEPT VISIT HI MDM: CPT

## 2020-10-06 PROCEDURE — 36415 COLL VENOUS BLD VENIPUNCTURE: CPT

## 2020-10-06 PROCEDURE — 80069 RENAL FUNCTION PANEL: CPT

## 2020-10-06 PROCEDURE — 80053 COMPREHEN METABOLIC PANEL: CPT

## 2020-10-06 PROCEDURE — 86900 BLOOD TYPING SEROLOGIC ABO: CPT

## 2020-10-06 PROCEDURE — 85730 THROMBOPLASTIN TIME PARTIAL: CPT

## 2020-10-06 PROCEDURE — 30233N1 TRANSFUSION OF NONAUTOLOGOUS RED BLOOD CELLS INTO PERIPHERAL VEIN, PERCUTANEOUS APPROACH: ICD-10-PCS | Performed by: FAMILY MEDICINE

## 2020-10-06 PROCEDURE — 36430 TRANSFUSION BLD/BLD COMPNT: CPT

## 2020-10-06 PROCEDURE — 78582 LUNG VENTILAT&PERFUS IMAGING: CPT

## 2020-10-06 PROCEDURE — 85018 HEMOGLOBIN: CPT

## 2020-10-06 PROCEDURE — 83690 ASSAY OF LIPASE: CPT

## 2020-10-06 PROCEDURE — 86923 COMPATIBILITY TEST ELECTRIC: CPT

## 2020-10-06 PROCEDURE — 71045 X-RAY EXAM CHEST 1 VIEW: CPT

## 2020-10-06 PROCEDURE — P9016 RBC LEUKOCYTES REDUCED: HCPCS

## 2020-10-06 PROCEDURE — 86850 RBC ANTIBODY SCREEN: CPT

## 2020-10-06 PROCEDURE — 87635 SARS-COV-2 COVID-19 AMP PRB: CPT

## 2020-10-06 PROCEDURE — 93005 ELECTROCARDIOGRAM TRACING: CPT

## 2020-10-06 RX ADMIN — ROPINIROLE HYDROCHLORIDE SCH MG: 0.5 TABLET, FILM COATED ORAL at 20:28

## 2020-10-06 NOTE — NUR
THIS IS A 89 YO M BIB EMS FROM HOME W/ C/O WEAKNESS X1 MONTH. PT REPORTS DID 
OUTPATIENT LABS A FEW DAYS AGO AND GOT RESULTS TODAY, HGB 4.6. PT REPORTS 
MULTIPLE FALLS OVER THE PAST FEW MONTHS BUT NONE WITHIN THE PAST 48 HOURS.  PT 
REPORTS PRODUCTIVE COUGH X7 MONTHS, QUIT SMOKING 2 WEEKS AGO. PT REPORTS NO 
OTHER COMPLAINTS AT THIS TIME. PIV PTA EMS. PT RESTING ON [place] W/ CALL LIGHT 
IN REACH AND SIDE RAILS UPX2. CONNECTED TO ALL MONITORING.

## 2020-10-07 VITALS — DIASTOLIC BLOOD PRESSURE: 58 MMHG | SYSTOLIC BLOOD PRESSURE: 107 MMHG

## 2020-10-07 VITALS — DIASTOLIC BLOOD PRESSURE: 61 MMHG | SYSTOLIC BLOOD PRESSURE: 114 MMHG

## 2020-10-07 VITALS — SYSTOLIC BLOOD PRESSURE: 131 MMHG | DIASTOLIC BLOOD PRESSURE: 65 MMHG

## 2020-10-07 VITALS — DIASTOLIC BLOOD PRESSURE: 76 MMHG | SYSTOLIC BLOOD PRESSURE: 164 MMHG

## 2020-10-07 LAB
BASOPHILS # BLD AUTO: 0 X10^3/UL (ref 0–0.1)
BASOPHILS NFR BLD AUTO: 1 % (ref 0–1)
EOSINOPHIL # BLD AUTO: 0 X10^3/UL (ref 0–0.4)
EOSINOPHIL NFR BLD AUTO: 0 % (ref 1–7)
ERYTHROCYTE [DISTWIDTH] IN BLOOD BY AUTOMATED COUNT: 16.7 % (ref 9.4–14.8)
LYMPHOCYTES # BLD AUTO: 0.3 X10^3/UL (ref 1–3.4)
LYMPHOCYTES NFR BLD AUTO: 3 % (ref 22–44)
MCH RBC QN AUTO: 30.5 PG (ref 27.5–34.5)
MCHC RBC AUTO-ENTMCNC: 32.8 G/DL (ref 33.2–36.2)
MD: (no result)
MONOCYTES # BLD AUTO: 0.3 X10^3/UL (ref 0.2–0.8)
MONOCYTES NFR BLD AUTO: 3 % (ref 2–9)
NEUTROPHILS # BLD AUTO: 9 X10^3/UL (ref 1.8–6.8)
NEUTROPHILS NFR BLD AUTO: 93 % (ref 42–75)
PLATELET # BLD AUTO: 188 X10^3/UL (ref 130–400)
PMV BLD AUTO: 7.8 FL (ref 7.4–10.4)
RBC # BLD AUTO: 2.5 X10^6/UL (ref 4.38–5.82)

## 2020-10-07 RX ADMIN — IPRATROPIUM BROMIDE AND ALBUTEROL SULFATE SCH PUFF: 103; 18 AEROSOL, METERED RESPIRATORY (INHALATION) at 16:00

## 2020-10-07 RX ADMIN — IPRATROPIUM BROMIDE AND ALBUTEROL SULFATE SCH PUFF: 103; 18 AEROSOL, METERED RESPIRATORY (INHALATION) at 11:45

## 2020-10-07 RX ADMIN — ROPINIROLE HYDROCHLORIDE SCH MG: 0.5 TABLET, FILM COATED ORAL at 09:05

## 2020-10-07 RX ADMIN — IPRATROPIUM BROMIDE AND ALBUTEROL SULFATE SCH PUFF: 103; 18 AEROSOL, METERED RESPIRATORY (INHALATION) at 07:35

## 2020-10-07 RX ADMIN — TAMSULOSIN HYDROCHLORIDE SCH MG: 0.4 CAPSULE ORAL at 11:02

## 2020-10-07 RX ADMIN — IPRATROPIUM BROMIDE AND ALBUTEROL SULFATE SCH PUFF: 103; 18 AEROSOL, METERED RESPIRATORY (INHALATION) at 20:14

## 2020-10-08 VITALS — DIASTOLIC BLOOD PRESSURE: 65 MMHG | SYSTOLIC BLOOD PRESSURE: 123 MMHG

## 2020-10-08 VITALS — SYSTOLIC BLOOD PRESSURE: 105 MMHG | DIASTOLIC BLOOD PRESSURE: 53 MMHG

## 2020-10-08 VITALS — DIASTOLIC BLOOD PRESSURE: 53 MMHG | SYSTOLIC BLOOD PRESSURE: 112 MMHG

## 2020-10-08 VITALS — SYSTOLIC BLOOD PRESSURE: 123 MMHG | DIASTOLIC BLOOD PRESSURE: 62 MMHG

## 2020-10-08 VITALS — DIASTOLIC BLOOD PRESSURE: 56 MMHG | SYSTOLIC BLOOD PRESSURE: 122 MMHG

## 2020-10-08 VITALS — DIASTOLIC BLOOD PRESSURE: 49 MMHG | SYSTOLIC BLOOD PRESSURE: 101 MMHG

## 2020-10-08 VITALS — DIASTOLIC BLOOD PRESSURE: 51 MMHG | SYSTOLIC BLOOD PRESSURE: 100 MMHG

## 2020-10-08 VITALS — DIASTOLIC BLOOD PRESSURE: 64 MMHG | SYSTOLIC BLOOD PRESSURE: 126 MMHG

## 2020-10-08 LAB
ALBUMIN SERPL-MCNC: 2.2 G/DL (ref 3.4–5)
ANION GAP SERPL CALC-SCNC: 9 MMOL/L (ref 5–15)
CALCIUM SERPL-MCNC: 7.7 MG/DL (ref 8.5–10.1)
CHLORIDE SERPL-SCNC: 110 MMOL/L (ref 98–107)
CREAT SERPL-MCNC: 1.89 MG/DL (ref 0.7–1.3)

## 2020-10-08 PROCEDURE — 0DBN8ZX EXCISION OF SIGMOID COLON, VIA NATURAL OR ARTIFICIAL OPENING ENDOSCOPIC, DIAGNOSTIC: ICD-10-PCS | Performed by: INTERNAL MEDICINE

## 2020-10-08 PROCEDURE — 0DJ08ZZ INSPECTION OF UPPER INTESTINAL TRACT, VIA NATURAL OR ARTIFICIAL OPENING ENDOSCOPIC: ICD-10-PCS | Performed by: INTERNAL MEDICINE

## 2020-10-08 PROCEDURE — 0DBM8ZX EXCISION OF DESCENDING COLON, VIA NATURAL OR ARTIFICIAL OPENING ENDOSCOPIC, DIAGNOSTIC: ICD-10-PCS | Performed by: INTERNAL MEDICINE

## 2020-10-08 PROCEDURE — 0DBK8ZX EXCISION OF ASCENDING COLON, VIA NATURAL OR ARTIFICIAL OPENING ENDOSCOPIC, DIAGNOSTIC: ICD-10-PCS | Performed by: INTERNAL MEDICINE

## 2020-10-08 RX ADMIN — TRAZODONE HYDROCHLORIDE PRN MG: 50 TABLET ORAL at 00:23

## 2020-10-08 RX ADMIN — IPRATROPIUM BROMIDE AND ALBUTEROL SULFATE SCH PUFF: 103; 18 AEROSOL, METERED RESPIRATORY (INHALATION) at 08:00

## 2020-10-08 RX ADMIN — ROPINIROLE HYDROCHLORIDE SCH MG: 0.5 TABLET, FILM COATED ORAL at 15:27

## 2020-10-08 RX ADMIN — TRAZODONE HYDROCHLORIDE PRN MG: 50 TABLET ORAL at 23:28

## 2020-10-08 RX ADMIN — LIOTHYRONINE SODIUM SCH MCG: 5 TABLET ORAL at 15:26

## 2020-10-08 RX ADMIN — IPRATROPIUM BROMIDE AND ALBUTEROL SULFATE SCH PUFF: 103; 18 AEROSOL, METERED RESPIRATORY (INHALATION) at 19:58

## 2020-10-08 RX ADMIN — TAMSULOSIN HYDROCHLORIDE SCH MG: 0.4 CAPSULE ORAL at 15:26

## 2020-10-08 RX ADMIN — IPRATROPIUM BROMIDE AND ALBUTEROL SULFATE SCH PUFF: 103; 18 AEROSOL, METERED RESPIRATORY (INHALATION) at 18:55

## 2020-10-08 RX ADMIN — IPRATROPIUM BROMIDE AND ALBUTEROL SULFATE SCH PUFF: 103; 18 AEROSOL, METERED RESPIRATORY (INHALATION) at 10:55

## 2020-10-09 VITALS — SYSTOLIC BLOOD PRESSURE: 134 MMHG | DIASTOLIC BLOOD PRESSURE: 63 MMHG

## 2020-10-09 VITALS — SYSTOLIC BLOOD PRESSURE: 135 MMHG | DIASTOLIC BLOOD PRESSURE: 74 MMHG

## 2020-10-09 VITALS — DIASTOLIC BLOOD PRESSURE: 60 MMHG | SYSTOLIC BLOOD PRESSURE: 125 MMHG

## 2020-10-09 VITALS — SYSTOLIC BLOOD PRESSURE: 124 MMHG | DIASTOLIC BLOOD PRESSURE: 62 MMHG

## 2020-10-09 RX ADMIN — ROPINIROLE HYDROCHLORIDE SCH MG: 0.5 TABLET, FILM COATED ORAL at 08:35

## 2020-10-09 RX ADMIN — LIOTHYRONINE SODIUM SCH MCG: 5 TABLET ORAL at 08:35

## 2020-10-09 RX ADMIN — TAMSULOSIN HYDROCHLORIDE SCH MG: 0.4 CAPSULE ORAL at 08:35

## 2020-10-09 RX ADMIN — IPRATROPIUM BROMIDE AND ALBUTEROL SULFATE SCH PUFF: 103; 18 AEROSOL, METERED RESPIRATORY (INHALATION) at 06:11

## 2020-10-09 RX ADMIN — IPRATROPIUM BROMIDE AND ALBUTEROL SULFATE SCH PUFF: 103; 18 AEROSOL, METERED RESPIRATORY (INHALATION) at 10:50

## 2020-10-09 RX ADMIN — IPRATROPIUM BROMIDE AND ALBUTEROL SULFATE SCH PUFF: 103; 18 AEROSOL, METERED RESPIRATORY (INHALATION) at 20:03

## 2020-10-09 RX ADMIN — IPRATROPIUM BROMIDE AND ALBUTEROL SULFATE SCH PUFF: 103; 18 AEROSOL, METERED RESPIRATORY (INHALATION) at 15:26

## 2020-10-09 RX ADMIN — MAGNESIUM HYDROXIDE SCH ML: 1200 SUSPENSION ORAL at 15:53

## 2020-10-10 VITALS — SYSTOLIC BLOOD PRESSURE: 124 MMHG | DIASTOLIC BLOOD PRESSURE: 62 MMHG

## 2020-10-10 VITALS — DIASTOLIC BLOOD PRESSURE: 60 MMHG | SYSTOLIC BLOOD PRESSURE: 132 MMHG

## 2020-10-10 VITALS — DIASTOLIC BLOOD PRESSURE: 72 MMHG | SYSTOLIC BLOOD PRESSURE: 152 MMHG

## 2020-10-10 LAB
ALBUMIN SERPL-MCNC: 2.3 G/DL (ref 3.4–5)
ANION GAP SERPL CALC-SCNC: 4 MMOL/L (ref 5–15)
CALCIUM SERPL-MCNC: 8.1 MG/DL (ref 8.5–10.1)
CHLORIDE SERPL-SCNC: 114 MMOL/L (ref 98–107)
CREAT SERPL-MCNC: 1.64 MG/DL (ref 0.7–1.3)

## 2020-10-10 RX ADMIN — LIOTHYRONINE SODIUM SCH MCG: 5 TABLET ORAL at 08:58

## 2020-10-10 RX ADMIN — ROPINIROLE HYDROCHLORIDE SCH MG: 0.5 TABLET, FILM COATED ORAL at 08:58

## 2020-10-10 RX ADMIN — IPRATROPIUM BROMIDE AND ALBUTEROL SULFATE SCH PUFF: 103; 18 AEROSOL, METERED RESPIRATORY (INHALATION) at 05:58

## 2020-10-10 RX ADMIN — IPRATROPIUM BROMIDE AND ALBUTEROL SULFATE SCH PUFF: 103; 18 AEROSOL, METERED RESPIRATORY (INHALATION) at 10:23

## 2020-10-10 RX ADMIN — MAGNESIUM HYDROXIDE SCH ML: 1200 SUSPENSION ORAL at 08:58

## 2020-10-10 RX ADMIN — IPRATROPIUM BROMIDE AND ALBUTEROL SULFATE SCH PUFF: 103; 18 AEROSOL, METERED RESPIRATORY (INHALATION) at 15:02

## 2020-10-10 RX ADMIN — TAMSULOSIN HYDROCHLORIDE SCH MG: 0.4 CAPSULE ORAL at 08:58

## 2021-01-14 DIAGNOSIS — Z23 NEED FOR VACCINATION: ICD-10-CM

## 2021-07-01 ENCOUNTER — HOSPITAL ENCOUNTER (OUTPATIENT)
Dept: HOSPITAL 8 - ROC | Age: 86
Discharge: HOME | End: 2021-07-01
Attending: RADIOLOGY
Payer: MEDICARE

## 2021-07-01 DIAGNOSIS — N18.2: ICD-10-CM

## 2021-07-01 DIAGNOSIS — E03.9: ICD-10-CM

## 2021-07-01 DIAGNOSIS — I12.9: ICD-10-CM

## 2021-07-01 DIAGNOSIS — J44.9: ICD-10-CM

## 2021-07-01 DIAGNOSIS — D50.0: ICD-10-CM

## 2021-07-01 DIAGNOSIS — G89.29: ICD-10-CM

## 2021-07-01 DIAGNOSIS — Z79.899: ICD-10-CM

## 2021-07-01 DIAGNOSIS — Z87.891: ICD-10-CM

## 2021-07-01 DIAGNOSIS — E78.00: ICD-10-CM

## 2021-07-01 DIAGNOSIS — C34.11: Primary | ICD-10-CM

## 2021-07-01 DIAGNOSIS — I48.0: ICD-10-CM

## 2021-07-01 PROCEDURE — G0463 HOSPITAL OUTPT CLINIC VISIT: HCPCS

## 2021-07-01 PROCEDURE — 99214 OFFICE O/P EST MOD 30 MIN: CPT

## 2021-07-20 ENCOUNTER — HOSPITAL ENCOUNTER (OUTPATIENT)
Dept: HOSPITAL 8 - RAD | Age: 86
Discharge: HOME | End: 2021-07-20
Attending: RADIOLOGY
Payer: MEDICARE

## 2021-07-20 VITALS — SYSTOLIC BLOOD PRESSURE: 130 MMHG | DIASTOLIC BLOOD PRESSURE: 59 MMHG

## 2021-07-20 VITALS — WEIGHT: 150.8 LBS | BODY MASS INDEX: 21.59 KG/M2 | HEIGHT: 70 IN

## 2021-07-20 DIAGNOSIS — R91.8: Primary | ICD-10-CM

## 2021-07-20 DIAGNOSIS — N18.30: ICD-10-CM

## 2021-07-20 DIAGNOSIS — Z80.3: ICD-10-CM

## 2021-07-20 DIAGNOSIS — Z79.899: ICD-10-CM

## 2021-07-20 DIAGNOSIS — F17.210: ICD-10-CM

## 2021-07-20 DIAGNOSIS — Z82.49: ICD-10-CM

## 2021-07-20 DIAGNOSIS — I48.91: ICD-10-CM

## 2021-07-20 DIAGNOSIS — Z88.8: ICD-10-CM

## 2021-07-20 DIAGNOSIS — I12.9: ICD-10-CM

## 2021-07-20 DIAGNOSIS — C34.11: ICD-10-CM

## 2021-07-20 PROCEDURE — 99156 MOD SED OTH PHYS/QHP 5/>YRS: CPT

## 2021-07-20 PROCEDURE — 77012 CT SCAN FOR NEEDLE BIOPSY: CPT

## 2021-07-20 PROCEDURE — 99157 MOD SED OTHER PHYS/QHP EA: CPT

## 2021-07-20 PROCEDURE — 88305 TISSUE EXAM BY PATHOLOGIST: CPT

## 2021-07-20 PROCEDURE — 88333 PATH CONSLTJ SURG CYTO XM 1: CPT

## 2021-07-20 PROCEDURE — 32408 CORE NDL BX LNG/MED PERQ: CPT

## 2021-08-12 ENCOUNTER — HOSPITAL ENCOUNTER (OUTPATIENT)
Dept: HOSPITAL 8 - STAR | Age: 86
Discharge: HOME | End: 2021-08-12
Attending: INTERNAL MEDICINE
Payer: MEDICARE

## 2021-08-12 DIAGNOSIS — D50.9: ICD-10-CM

## 2021-08-12 DIAGNOSIS — Z01.818: Primary | ICD-10-CM

## 2021-08-12 DIAGNOSIS — I45.10: ICD-10-CM

## 2021-08-12 LAB
ALBUMIN SERPL-MCNC: 3 G/DL (ref 3.4–5)
ALP SERPL-CCNC: 94 U/L (ref 45–117)
ALT SERPL-CCNC: 14 U/L (ref 12–78)
ANION GAP SERPL CALC-SCNC: 5 MMOL/L (ref 5–15)
BASOPHILS # BLD AUTO: 0.1 X10^3/UL (ref 0–0.1)
BASOPHILS NFR BLD AUTO: 1 % (ref 0–1)
BILIRUB SERPL-MCNC: 0.3 MG/DL (ref 0.2–1)
CALCIUM SERPL-MCNC: 8.7 MG/DL (ref 8.5–10.1)
CHLORIDE SERPL-SCNC: 111 MMOL/L (ref 98–107)
CREAT SERPL-MCNC: 1.68 MG/DL (ref 0.7–1.3)
EOSINOPHIL # BLD AUTO: 0.2 X10^3/UL (ref 0–0.4)
EOSINOPHIL NFR BLD AUTO: 4 % (ref 1–7)
ERYTHROCYTE [DISTWIDTH] IN BLOOD BY AUTOMATED COUNT: 15.9 % (ref 9.4–14.8)
LYMPHOCYTES # BLD AUTO: 1.2 X10^3/UL (ref 1–3.4)
LYMPHOCYTES NFR BLD AUTO: 20 % (ref 22–44)
MCH RBC QN AUTO: 31.1 PG (ref 27.5–34.5)
MCHC RBC AUTO-ENTMCNC: 32.6 G/DL (ref 33.2–36.2)
MONOCYTES # BLD AUTO: 0.5 X10^3/UL (ref 0.2–0.8)
MONOCYTES NFR BLD AUTO: 8 % (ref 2–9)
NEUTROPHILS # BLD AUTO: 4.1 X10^3/UL (ref 1.8–6.8)
NEUTROPHILS NFR BLD AUTO: 68 % (ref 42–75)
PLATELET # BLD AUTO: 198 X10^3/UL (ref 130–400)
PMV BLD AUTO: 7.2 FL (ref 7.4–10.4)
PROT SERPL-MCNC: 7 G/DL (ref 6.4–8.2)
RBC # BLD AUTO: 3.53 X10^6/UL (ref 4.38–5.82)

## 2021-08-12 PROCEDURE — 93005 ELECTROCARDIOGRAM TRACING: CPT

## 2021-08-12 PROCEDURE — 36415 COLL VENOUS BLD VENIPUNCTURE: CPT

## 2021-08-12 PROCEDURE — 85025 COMPLETE CBC W/AUTO DIFF WBC: CPT

## 2021-08-12 PROCEDURE — 80053 COMPREHEN METABOLIC PANEL: CPT

## 2021-08-17 ENCOUNTER — HOSPITAL ENCOUNTER (OUTPATIENT)
Dept: HOSPITAL 8 - OUT | Age: 86
Discharge: HOME | End: 2021-08-17
Attending: INTERNAL MEDICINE
Payer: MEDICARE

## 2021-08-17 VITALS — DIASTOLIC BLOOD PRESSURE: 54 MMHG | SYSTOLIC BLOOD PRESSURE: 161 MMHG

## 2021-08-17 VITALS — BODY MASS INDEX: 20.99 KG/M2 | HEIGHT: 71 IN | WEIGHT: 149.91 LBS

## 2021-08-17 DIAGNOSIS — N18.9: ICD-10-CM

## 2021-08-17 DIAGNOSIS — Z88.8: ICD-10-CM

## 2021-08-17 DIAGNOSIS — K31.811: Primary | ICD-10-CM

## 2021-08-17 DIAGNOSIS — I12.9: ICD-10-CM

## 2021-08-17 DIAGNOSIS — D50.0: ICD-10-CM

## 2021-08-17 PROCEDURE — 44369 SMALL BOWEL ENDOSCOPY: CPT

## 2022-01-01 ENCOUNTER — APPOINTMENT (OUTPATIENT)
Dept: RADIOLOGY | Facility: MEDICAL CENTER | Age: 87
DRG: 303 | End: 2022-01-01
Attending: GENERAL PRACTICE
Payer: COMMERCIAL

## 2022-01-01 ENCOUNTER — APPOINTMENT (OUTPATIENT)
Dept: RADIOLOGY | Facility: MEDICAL CENTER | Age: 87
DRG: 303 | End: 2022-01-01
Attending: EMERGENCY MEDICINE
Payer: COMMERCIAL

## 2022-01-01 ENCOUNTER — HOSPITAL ENCOUNTER (INPATIENT)
Facility: MEDICAL CENTER | Age: 87
LOS: 1 days | DRG: 303 | End: 2022-07-28
Attending: EMERGENCY MEDICINE | Admitting: GENERAL PRACTICE
Payer: COMMERCIAL

## 2022-01-01 VITALS
SYSTOLIC BLOOD PRESSURE: 174 MMHG | TEMPERATURE: 97.9 F | OXYGEN SATURATION: 94 % | HEART RATE: 91 BPM | DIASTOLIC BLOOD PRESSURE: 86 MMHG | BODY MASS INDEX: 21.56 KG/M2 | HEIGHT: 71 IN | WEIGHT: 154 LBS | RESPIRATION RATE: 16 BRPM

## 2022-01-01 DIAGNOSIS — R20.0 LOWER EXTREMITY NUMBNESS: ICD-10-CM

## 2022-01-01 DIAGNOSIS — R29.898 WEAKNESS OF BOTH LOWER EXTREMITIES: ICD-10-CM
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
## 2022-01-01 DIAGNOSIS — R19.5 FECAL OCCULT BLOOD TEST POSITIVE: ICD-10-CM

## 2022-01-01 DIAGNOSIS — I99.8 LOWER LIMB ISCHEMIA: ICD-10-CM

## 2022-01-01 DIAGNOSIS — D64.9 ANEMIA, UNSPECIFIED TYPE: ICD-10-CM

## 2022-01-01 LAB
ABO + RH BLD: ABNORMAL
ABO GROUP BLD: ABNORMAL
ALBUMIN SERPL BCP-MCNC: 2.7 G/DL (ref 3.2–4.9)
ALBUMIN/GLOB SERPL: 0.9 G/DL
ALP SERPL-CCNC: 73 U/L (ref 30–99)
ALT SERPL-CCNC: 11 U/L (ref 2–50)
ANION GAP SERPL CALC-SCNC: 12 MMOL/L (ref 7–16)
AST SERPL-CCNC: 15 U/L (ref 12–45)
BARCODED ABORH UBTYP: 5100
BARCODED ABORH UBTYP: 5100
BARCODED PRD CODE UBPRD: ABNORMAL
BARCODED PRD CODE UBPRD: ABNORMAL
BARCODED UNIT NUM UBUNT: ABNORMAL
BARCODED UNIT NUM UBUNT: ABNORMAL
BASOPHILS # BLD AUTO: 0.2 % (ref 0–1.8)
BASOPHILS # BLD: 0.01 K/UL (ref 0–0.12)
BILIRUB SERPL-MCNC: 0.5 MG/DL (ref 0.1–1.5)
BLD GP AB SCN SERPL QL: ABNORMAL
BUN SERPL-MCNC: 27 MG/DL (ref 8–22)
CALCIUM SERPL-MCNC: 8.4 MG/DL (ref 8.5–10.5)
CHLORIDE SERPL-SCNC: 109 MMOL/L (ref 96–112)
CO2 SERPL-SCNC: 19 MMOL/L (ref 20–33)
COMPONENT R 8504R: ABNORMAL
COMPONENT R 8504R: ABNORMAL
CREAT SERPL-MCNC: 1.65 MG/DL (ref 0.5–1.4)
EOSINOPHIL # BLD AUTO: 0.01 K/UL (ref 0–0.51)
EOSINOPHIL NFR BLD: 0.2 % (ref 0–6.9)
ERYTHROCYTE [DISTWIDTH] IN BLOOD BY AUTOMATED COUNT: 51.6 FL (ref 35.9–50)
GFR SERPLBLD CREATININE-BSD FMLA CKD-EPI: 39 ML/MIN/1.73 M 2
GLOBULIN SER CALC-MCNC: 3.1 G/DL (ref 1.9–3.5)
GLUCOSE SERPL-MCNC: 117 MG/DL (ref 65–99)
HCT VFR BLD AUTO: 19.7 % (ref 42–52)
HGB BLD-MCNC: 6.4 G/DL (ref 14–18)
HGB RETIC QN AUTO: 31.8 PG/CELL (ref 29–35)
IMM GRANULOCYTES # BLD AUTO: 0.06 K/UL (ref 0–0.11)
IMM GRANULOCYTES NFR BLD AUTO: 1 % (ref 0–0.9)
IMM RETICS NFR: 17.1 % (ref 9.3–17.4)
IRON SATN MFR SERPL: 30 % (ref 15–55)
IRON SERPL-MCNC: 41 UG/DL (ref 50–180)
LACTATE SERPL-SCNC: 1.5 MMOL/L (ref 0.5–2)
LYMPHOCYTES # BLD AUTO: 0.24 K/UL (ref 1–4.8)
LYMPHOCYTES NFR BLD: 4 % (ref 22–41)
MCH RBC QN AUTO: 30.6 PG (ref 27–33)
MCHC RBC AUTO-ENTMCNC: 32.5 G/DL (ref 33.7–35.3)
MCV RBC AUTO: 94.3 FL (ref 81.4–97.8)
MONOCYTES # BLD AUTO: 0.3 K/UL (ref 0–0.85)
MONOCYTES NFR BLD AUTO: 5 % (ref 0–13.4)
NEUTROPHILS # BLD AUTO: 5.38 K/UL (ref 1.82–7.42)
NEUTROPHILS NFR BLD: 89.6 % (ref 44–72)
NRBC # BLD AUTO: 0 K/UL
NRBC BLD-RTO: 0 /100 WBC
PLATELET # BLD AUTO: 116 K/UL (ref 164–446)
PMV BLD AUTO: 9.7 FL (ref 9–12.9)
POTASSIUM SERPL-SCNC: 3.6 MMOL/L (ref 3.6–5.5)
PRODUCT TYPE UPROD: ABNORMAL
PRODUCT TYPE UPROD: ABNORMAL
PROT SERPL-MCNC: 5.8 G/DL (ref 6–8.2)
RBC # BLD AUTO: 2.09 M/UL (ref 4.7–6.1)
RETICS # AUTO: 0.02 M/UL (ref 0.04–0.06)
RETICS/RBC NFR: 1.1 % (ref 0.8–2.1)
RH BLD: ABNORMAL
SODIUM SERPL-SCNC: 140 MMOL/L (ref 135–145)
TIBC SERPL-MCNC: 136 UG/DL (ref 250–450)
UIBC SERPL-MCNC: 95 UG/DL (ref 110–370)
UNIT STATUS USTAT: ABNORMAL
UNIT STATUS USTAT: ABNORMAL
WBC # BLD AUTO: 6 K/UL (ref 4.8–10.8)

## 2022-01-01 PROCEDURE — 72131 CT LUMBAR SPINE W/O DYE: CPT | Mod: ME

## 2022-01-01 PROCEDURE — 85046 RETICYTE/HGB CONCENTRATE: CPT

## 2022-01-01 PROCEDURE — 83550 IRON BINDING TEST: CPT

## 2022-01-01 PROCEDURE — 700102 HCHG RX REV CODE 250 W/ 637 OVERRIDE(OP)

## 2022-01-01 PROCEDURE — 80053 COMPREHEN METABOLIC PANEL: CPT

## 2022-01-01 PROCEDURE — 306637 HCHG MISC ORTHO ITEM RC 0274

## 2022-01-01 PROCEDURE — 72128 CT CHEST SPINE W/O DYE: CPT | Mod: MG

## 2022-01-01 PROCEDURE — 99223 1ST HOSP IP/OBS HIGH 75: CPT | Mod: AI | Performed by: GENERAL PRACTICE

## 2022-01-01 PROCEDURE — 83540 ASSAY OF IRON: CPT

## 2022-01-01 PROCEDURE — 86900 BLOOD TYPING SEROLOGIC ABO: CPT

## 2022-01-01 PROCEDURE — 86850 RBC ANTIBODY SCREEN: CPT

## 2022-01-01 PROCEDURE — 83605 ASSAY OF LACTIC ACID: CPT

## 2022-01-01 PROCEDURE — 72125 CT NECK SPINE W/O DYE: CPT | Mod: ME

## 2022-01-01 PROCEDURE — 36415 COLL VENOUS BLD VENIPUNCTURE: CPT

## 2022-01-01 PROCEDURE — 36430 TRANSFUSION BLD/BLD COMPNT: CPT

## 2022-01-01 PROCEDURE — 96376 TX/PRO/DX INJ SAME DRUG ADON: CPT

## 2022-01-01 PROCEDURE — A9270 NON-COVERED ITEM OR SERVICE: HCPCS

## 2022-01-01 PROCEDURE — C9113 INJ PANTOPRAZOLE SODIUM, VIA: HCPCS | Performed by: EMERGENCY MEDICINE

## 2022-01-01 PROCEDURE — 700102 HCHG RX REV CODE 250 W/ 637 OVERRIDE(OP): Performed by: GENERAL PRACTICE

## 2022-01-01 PROCEDURE — 96375 TX/PRO/DX INJ NEW DRUG ADDON: CPT

## 2022-01-01 PROCEDURE — P9016 RBC LEUKOCYTES REDUCED: HCPCS

## 2022-01-01 PROCEDURE — A9270 NON-COVERED ITEM OR SERVICE: HCPCS | Performed by: GENERAL PRACTICE

## 2022-01-01 PROCEDURE — 700111 HCHG RX REV CODE 636 W/ 250 OVERRIDE (IP)

## 2022-01-01 PROCEDURE — 770001 HCHG ROOM/CARE - MED/SURG/GYN PRIV*

## 2022-01-01 PROCEDURE — 96374 THER/PROPH/DIAG INJ IV PUSH: CPT

## 2022-01-01 PROCEDURE — 99285 EMERGENCY DEPT VISIT HI MDM: CPT

## 2022-01-01 PROCEDURE — 30233N1 TRANSFUSION OF NONAUTOLOGOUS RED BLOOD CELLS INTO PERIPHERAL VEIN, PERCUTANEOUS APPROACH: ICD-10-PCS | Performed by: GENERAL PRACTICE

## 2022-01-01 PROCEDURE — 86923 COMPATIBILITY TEST ELECTRIC: CPT | Mod: 91

## 2022-01-01 PROCEDURE — 85025 COMPLETE CBC W/AUTO DIFF WBC: CPT

## 2022-01-01 PROCEDURE — 700105 HCHG RX REV CODE 258: Performed by: EMERGENCY MEDICINE

## 2022-01-01 PROCEDURE — 700111 HCHG RX REV CODE 636 W/ 250 OVERRIDE (IP): Performed by: GENERAL PRACTICE

## 2022-01-01 PROCEDURE — 700111 HCHG RX REV CODE 636 W/ 250 OVERRIDE (IP): Performed by: EMERGENCY MEDICINE

## 2022-01-01 PROCEDURE — 72148 MRI LUMBAR SPINE W/O DYE: CPT | Mod: ME

## 2022-01-01 PROCEDURE — 70450 CT HEAD/BRAIN W/O DYE: CPT | Mod: ME

## 2022-01-01 PROCEDURE — 86901 BLOOD TYPING SEROLOGIC RH(D): CPT

## 2022-01-01 PROCEDURE — L0464 TLSO 4MOD SACRO-SCAP PRE: HCPCS

## 2022-01-01 RX ORDER — PANTOPRAZOLE SODIUM 40 MG/1
40 TABLET, DELAYED RELEASE ORAL DAILY
COMMUNITY

## 2022-01-01 RX ORDER — POLYETHYLENE GLYCOL 3350 17 G/17G
1 POWDER, FOR SOLUTION ORAL
Status: DISCONTINUED | OUTPATIENT
Start: 2022-01-01 | End: 2022-01-01

## 2022-01-01 RX ORDER — ONDANSETRON 2 MG/ML
8 INJECTION INTRAMUSCULAR; INTRAVENOUS EVERY 8 HOURS PRN
Status: DISCONTINUED | OUTPATIENT
Start: 2022-01-01 | End: 2022-01-01 | Stop reason: HOSPADM

## 2022-01-01 RX ORDER — LORAZEPAM 2 MG/ML
1 INJECTION INTRAMUSCULAR
Status: DISCONTINUED | OUTPATIENT
Start: 2022-01-01 | End: 2022-01-01 | Stop reason: HOSPADM

## 2022-01-01 RX ORDER — OXYCODONE HYDROCHLORIDE AND ACETAMINOPHEN 5; 325 MG/1; MG/1
1 TABLET ORAL ONCE
Status: DISCONTINUED | OUTPATIENT
Start: 2022-01-01 | End: 2022-01-01

## 2022-01-01 RX ORDER — FERROUS SULFATE 325(65) MG
325 TABLET ORAL DAILY
COMMUNITY

## 2022-01-01 RX ORDER — LORAZEPAM 2 MG/ML
1 CONCENTRATE ORAL
Status: DISCONTINUED | OUTPATIENT
Start: 2022-01-01 | End: 2022-01-01 | Stop reason: HOSPADM

## 2022-01-01 RX ORDER — MORPHINE SULFATE 100 MG/5ML
10 SOLUTION ORAL
Status: DISCONTINUED | OUTPATIENT
Start: 2022-01-01 | End: 2022-01-01 | Stop reason: HOSPADM

## 2022-01-01 RX ORDER — MORPHINE SULFATE 100 MG/5ML
20 SOLUTION ORAL
Status: DISCONTINUED | OUTPATIENT
Start: 2022-01-01 | End: 2022-01-01 | Stop reason: HOSPADM

## 2022-01-01 RX ORDER — PANTOPRAZOLE SODIUM 40 MG/10ML
40 INJECTION, POWDER, LYOPHILIZED, FOR SOLUTION INTRAVENOUS 2 TIMES DAILY
Status: DISCONTINUED | OUTPATIENT
Start: 2022-01-01 | End: 2022-01-01

## 2022-01-01 RX ORDER — BISACODYL 10 MG
10 SUPPOSITORY, RECTAL RECTAL
Status: DISCONTINUED | OUTPATIENT
Start: 2022-01-01 | End: 2022-01-01

## 2022-01-01 RX ORDER — MORPHINE SULFATE 4 MG/ML
4 INJECTION INTRAVENOUS ONCE
Status: COMPLETED | OUTPATIENT
Start: 2022-01-01 | End: 2022-01-01

## 2022-01-01 RX ORDER — FUROSEMIDE 20 MG/1
20 TABLET ORAL DAILY
COMMUNITY

## 2022-01-01 RX ORDER — ACETAMINOPHEN 325 MG/1
650 TABLET ORAL EVERY 6 HOURS PRN
Status: DISCONTINUED | OUTPATIENT
Start: 2022-01-01 | End: 2022-01-01

## 2022-01-01 RX ORDER — OXYCODONE HYDROCHLORIDE 5 MG/1
2.5 TABLET ORAL EVERY 4 HOURS PRN
Status: DISCONTINUED | OUTPATIENT
Start: 2022-01-01 | End: 2022-01-01

## 2022-01-01 RX ORDER — ONDANSETRON 4 MG/1
8 TABLET, ORALLY DISINTEGRATING ORAL EVERY 8 HOURS PRN
Status: DISCONTINUED | OUTPATIENT
Start: 2022-01-01 | End: 2022-01-01 | Stop reason: HOSPADM

## 2022-01-01 RX ORDER — TRAMADOL HYDROCHLORIDE 50 MG/1
50 TABLET ORAL EVERY 6 HOURS PRN
Status: DISCONTINUED | OUTPATIENT
Start: 2022-01-01 | End: 2022-01-01

## 2022-01-01 RX ORDER — FUROSEMIDE 20 MG/1
20 TABLET ORAL DAILY
Status: DISCONTINUED | OUTPATIENT
Start: 2022-01-01 | End: 2022-01-01

## 2022-01-01 RX ORDER — ATROPINE SULFATE 10 MG/ML
2 SOLUTION/ DROPS OPHTHALMIC EVERY 4 HOURS PRN
Status: DISCONTINUED | OUTPATIENT
Start: 2022-01-01 | End: 2022-01-01 | Stop reason: HOSPADM

## 2022-01-01 RX ORDER — AMOXICILLIN 250 MG
2 CAPSULE ORAL 2 TIMES DAILY
Status: DISCONTINUED | OUTPATIENT
Start: 2022-01-01 | End: 2022-01-01

## 2022-01-01 RX ORDER — SCOLOPAMINE TRANSDERMAL SYSTEM 1 MG/1
1 PATCH, EXTENDED RELEASE TRANSDERMAL
Status: DISCONTINUED | OUTPATIENT
Start: 2022-01-01 | End: 2022-01-01 | Stop reason: HOSPADM

## 2022-01-01 RX ORDER — POLYVINYL ALCOHOL 14 MG/ML
2 SOLUTION/ DROPS OPHTHALMIC EVERY 6 HOURS PRN
Status: DISCONTINUED | OUTPATIENT
Start: 2022-01-01 | End: 2022-01-01 | Stop reason: HOSPADM

## 2022-01-01 RX ADMIN — TRAMADOL HYDROCHLORIDE 50 MG: 50 TABLET, COATED ORAL at 15:52

## 2022-01-01 RX ADMIN — SCOPOLAMINE 1 PATCH: 1.5 PATCH, EXTENDED RELEASE TRANSDERMAL at 21:10

## 2022-01-01 RX ADMIN — FUROSEMIDE 20 MG: 20 TABLET ORAL at 15:52

## 2022-01-01 RX ADMIN — ONDANSETRON 8 MG: 2 INJECTION INTRAMUSCULAR; INTRAVENOUS at 20:47

## 2022-01-01 RX ADMIN — MORPHINE SULFATE 4 MG: 4 INJECTION INTRAVENOUS at 06:23

## 2022-01-01 RX ADMIN — MORPHINE SULFATE 5 MG: 10 INJECTION INTRAVENOUS at 20:47

## 2022-01-01 RX ADMIN — PANTOPRAZOLE SODIUM 80 MG: 40 INJECTION, POWDER, FOR SOLUTION INTRAVENOUS at 11:23

## 2022-01-01 RX ADMIN — MORPHINE SULFATE 4 MG: 4 INJECTION INTRAVENOUS at 08:37

## 2022-01-01 ASSESSMENT — ENCOUNTER SYMPTOMS
WEAKNESS: 1
BACK PAIN: 1
FOCAL WEAKNESS: 1

## 2022-01-01 ASSESSMENT — PAIN DESCRIPTION - PAIN TYPE
TYPE: ACUTE PAIN

## 2022-07-27 PROBLEM — K31.819 ANGIODYSPLASIA OF STOMACH AND DUODENUM: Status: ACTIVE | Noted: 2022-01-01

## 2022-07-27 PROBLEM — M62.81 MUSCLE WEAKNESS OF LOWER EXTREMITY: Status: ACTIVE | Noted: 2022-01-01

## 2022-07-27 PROBLEM — R29.6 RECURRENT FALLS: Status: ACTIVE | Noted: 2022-01-01

## 2022-07-27 PROBLEM — I48.0 PAROXYSMAL ATRIAL FIBRILLATION (HCC): Status: ACTIVE | Noted: 2022-01-01

## 2022-07-27 PROBLEM — D50.0 IRON DEFICIENCY ANEMIA DUE TO CHRONIC BLOOD LOSS: Status: ACTIVE | Noted: 2022-01-01

## 2022-07-27 PROBLEM — Z71.89 GOALS OF CARE, COUNSELING/DISCUSSION: Status: ACTIVE | Noted: 2022-01-01

## 2022-07-27 PROBLEM — I99.8 LOWER LIMB ISCHEMIA: Status: ACTIVE | Noted: 2022-01-01

## 2022-07-27 PROBLEM — I71.40 ABDOMINAL AORTIC ANEURYSM (AAA) WITHOUT RUPTURE (HCC): Status: ACTIVE | Noted: 2022-01-01

## 2022-07-27 NOTE — PROGRESS NOTES
Attention order for off the shelf TLSO back support brace to be fitted to pt has been submitted to [facility]. If any question you can contact [facility] at ext # [phone].

## 2022-07-27 NOTE — H&P
Hospital Medicine History & Physical Note    Date of Service  7/27/2022    Primary Care Physician  Sydney Partida M.D.    Consultants  GI, neurology, neurosurgery and vascular surgery    Specialist Names: Dr. Mallory, Dr. Mcleod, Dr. Poole, Dr. Monte    Code Status  Comfort Care/DNR    Chief Complaint  Chief Complaint   Patient presents with   • GLF     Pt bib ems for glf at home in his bathroom pt complaining of back pain. No head trauma, -thinners, -loc, no midline neck tenderness       History of Presenting Illness  This is a 89-year-old male with past medical history of atrial fibrillation, hyperlipidemia, CKD3b,  tobacco use disorder, history of RUL lung cancer s/p surgery 2015 with local recurrence treated with salvage SBRT in 2017, and abdominal aorta aneurysm 3.9cm, who was admitted on 07/27/2022 after sustaining a GLF with progressive BLE weakness x 3-4 weeks.     As per ERP, patient has been having progressive weakness over the past couple of weeks, patient formally ambulated with a walker but has now mostly been bedbound or sitting in a chair.  Patient was hospitalized in outside facility 3 weeks ago and was discharged home.    CT imaging of the lumbar spine noted L1 vertebral body loss with interval percent loss of height and acute fracture.  MRI imaging noted compression fracture some moderate stenosis but no findings consistent with the patient's presentation.    I discussed the case with neurology and neurosurgery, which had no further recommendations for any further work-up or imaging on their part.    I discussed the case with GI, given patient has history of angiodysplasia of the stomach and duodenum admitted with a hemoglobin of 6 -recommendations to transfusion and they will follow-up with the patient outpatient.    MRI imaging did note possible occlusion in the iliac arteries.  Patient later developed bilateral lower extremity coolness noted by ERP, paleness, and inability to palpate any pulses  from the inguinal area or bilateral feet.  Plans were to order a CTA with runoff, discussed the case with vascular surgery first and given patient's current state with hemoglobin of 6, age of 89, high risk of deterioration, and overall poor prognosis -he is not an ideal surgical candidate.    Discussed with the patient, patient's wife and patient's son Reagan - who are in agreement the patient should be transitioned to comfort care/hospice.  Which they prefer to be at a facility if possible. Hospice referral placed.    I discussed the plan of care with patient, family, bedside RN and Vascular Surgery, GI, Neurosurgery, Neurology.    Review of Systems  Review of Systems   Constitutional: Positive for malaise/fatigue.   Musculoskeletal: Positive for back pain.   Neurological: Positive for focal weakness and weakness.   All other systems reviewed and are negative.      Past Medical History   has a past medical history of Arthritis, Cancer (HCC) (2015), Dental disorder, Hypertension, Pain, Pneumonia (2017), Renal disorder, and Unspecified cataract.    Surgical History   has a past surgical history that includes thoracoscopy (Right, 7/22/2015); thoracotomy (Right, 7/22/2015); and other.     Family History  family history is not on file.   Family history reviewed with patient. There is no family history that is pertinent to the chief complaint.     Social History   reports that he quit smoking about 7 years ago. His smoking use included cigarettes. He has a 15.00 pack-year smoking history. He has never used smokeless tobacco. He reports that he does not drink alcohol and does not use drugs.    Allergies  No Known Allergies    Medications  Prior to Admission Medications   Prescriptions Last Dose Informant Patient Reported? Taking?   ferrous sulfate 325 (65 Fe) MG tablet unk at [facility] Yes No   Sig: Take 325 mg by mouth every day.   furosemide (LASIX) 20 MG Tab unk at [facility] Yes No    Sig: Take 20 mg by mouth every day.   metoprolol tartrate (LOPRESSOR) 25 MG Tab unk at [facility] Yes Yes   Sig: Take 12.5 mg by mouth 2 times a day.   pantoprazole (PROTONIX) 40 MG Tablet Delayed Response unk at [facility] Yes No   Sig: Take 40 Tablets by mouth every day.   ropinirole (REQUIP) 0.5 MG TABS unk at [facility] Yes No   Sig: Take 0.5 mg by mouth at bedtime.        Facility-Administered Medications: None       Physical Exam  Temp:  [36.5 °C (97.7 °F)-36.9 °C (98.4 °F)] 36.7 °C (98 °F)  Pulse:  [74-95] 85  Resp:  [13-21] 17  BP: (147-192)/() 178/84  SpO2:  [92 %-97 %] 93 %  Blood Pressure : (!) 153/70   Temperature: 36.9 °C (98.4 °F)   Pulse: 83   Respiration: 18   Pulse Oximetry: 95 %       Physical Exam  Vitals and nursing note reviewed.   Constitutional:       General: He is not in acute distress.     Appearance: Normal appearance.   HENT:      Head: Normocephalic and atraumatic.   Eyes:      Extraocular Movements: Extraocular movements intact.      Conjunctiva/sclera: Conjunctivae normal.      Pupils: Pupils are equal, round, and reactive to light.   Cardiovascular:      Rate and Rhythm: Normal rate and regular rhythm.      Pulses: Normal pulses.      Heart sounds: No murmur heard.    No friction rub. No gallop.   Pulmonary:      Effort: Pulmonary effort is normal. No respiratory distress.      Breath sounds: Normal breath sounds. No wheezing, rhonchi or rales.   Abdominal:      General: Bowel sounds are normal. There is no distension.      Palpations: Abdomen is soft.      Tenderness: There is no abdominal tenderness.   Musculoskeletal:         General: No swelling or tenderness. Normal range of motion.      Cervical back: Normal range of motion and neck supple. No muscular tenderness.      Right lower leg: No edema.      Left lower leg: No edema.      Comments: Unable to palpate any inguinal or dorsalis pedis pulses bilaterally even with  Doppler   Skin:     General: Skin is warm and dry.      Capillary Refill: Capillary refill takes less than 2 seconds.      Findings: No bruising, erythema or rash.      Comments: On reevaluation bilateral lower extremity cool to touch, pale, loss of sensation   Neurological:      General: No focal deficit present.      Mental Status: He is alert and oriented to person, place, and time.         Laboratory:  Recent Labs     07/27/22  0852   WBC 6.0   RBC 2.09*   HEMOGLOBIN 6.4*   HEMATOCRIT 19.7*   MCV 94.3   MCH 30.6   MCHC 32.5*   RDW 51.6*   PLATELETCT 116*   MPV 9.7     Recent Labs     07/27/22  0852   SODIUM 140   POTASSIUM 3.6   CHLORIDE 109   CO2 19*   GLUCOSE 117*   BUN 27*   CREATININE 1.65*   CALCIUM 8.4*     Recent Labs     07/27/22  0852   ALTSGPT 11   ASTSGOT 15   ALKPHOSPHAT 73   TBILIRUBIN 0.5   GLUCOSE 117*         No results for input(s): NTPROBNP in the last 72 hours.      No results for input(s): TROPONINT in the last 72 hours.    Imaging:  MR-LUMBAR SPINE-W/O   Final Result      1.  Acute L1 superior endplate compression fracture with approximately 30-35% loss of height. No significant bony retropulsion or ligamentous injury. No epidural hematoma.   2.  L3-S1 laminectomies.   3.  Multilevel degenerative changes as detailed above. Mild spinal stenosis L2-L3.   4.  L4-L5 diffuse disc osteophyte with prominent posterior left bulge/protrusion producing left lateral recess stenosis which may involve the traversing left L5 nerve. Moderate to severe left foraminal stenosis.   5.  Partially visualized abdominal aortic aneurysm. There is ectasia and abnormal signal within the iliac arteries, with occlusion not excluded. CT angiogram is suggested to further evaluate as clinically indicated.         CT-LSPINE W/O PLUS RECONS   Final Result      1. Acute fracture of the superior endplate of the L1 vertebral body with 24% loss of vertebral body height anteriorly. No retropulsion of fracture fragments.   2. Old  fracture of the posterior right 12th rib.   3. Progressive degenerative disc disease at L4-5.   4. Incompletely imaged aneurysmal dilation of the infrarenal abdominal aorta, measuring up to 3.9 cm in diameter.      CT-TSPINE W/O PLUS RECONS   Final Result      1. Acute superior endplate compression fracture of the L1 vertebral body with 24% loss of vertebral body height anteriorly. No retropulsion of fracture fragments.   2. Healing fracture of the posterior right 12th rib.   3. No acute posttraumatic findings involving the thoracic vertebral bodies.      CT-CSPINE WITHOUT PLUS RECONS   Final Result         1.  Multilevel degenerative changes of the cervical spine limit diagnostic sensitivity of this examination   2.  5 mm anterolisthesis C7 on T1, appears likely secondary to associated severe facet arthrosis, traumatic listhesis could have radiographically similar appearance, otherwise no acute traumatic bony injury of the cervical spine is apparent.   3.  Atherosclerosis      CT-HEAD W/O   Final Result         1.  No acute intracranial abnormality is identified, there are nonspecific white matter changes, commonly associated with small vessel ischemic disease.  Associated mild cerebral atrophy is noted.   2.  Atherosclerosis.             no X-Ray or EKG requiring interpretation    Assessment/Plan:  Justification for Admission Status  I anticipate this patient will require at least two midnights for appropriate medical management, necessitating inpatient admission because Patient noted to have critical bilateral lower extremity limb ischemia    Patient will need a Med/Surg bed on NEUROLOGY service .  The need is secondary to bilateral lower extremity limb ischemia.    * Lower limb ischemia  Assessment & Plan  Bilateral  Patient admitted to progressive overall worsening weakness for the past 4 weeks, with recurrent falls over the past couple days.  Throughout the admission, patient started to have bilateral lower  extremity cool limbs, unable to palpate pulses, no audible pulses in iliac region bilaterally.   Given history of abdominal aneurysm, hemoglobin of 6, patient's age, concern for possible blockage now with BLE ischemia   Discussed case with vascular surgery, patient is a poor surgical candidate and high risk of deterioration - refrained from ordering CTA runoff - until goals of care conversation with family  Discussed with the patient, patient's wife and patient's son Reagan -who are in agreement the patient should be transition to comfort care/hospice   Hospice referral placed.    Goals of care, counseling/discussion  Assessment & Plan  Given history of abdominal aneurysm, hemoglobin of 6, patient's age, concern for possible blockage now with BLE ischemia   Discussed case with vascular surgery, patient is a poor surgical candidate and high risk of deterioration.  Discussed with the patient, patient's wife and patient's son Reagan - who are in agreement the patient should be transition to comfort care/hospice   Hospice referral placed.    Angiodysplasia of stomach and duodenum  Assessment & Plan  History of  Spoke with [facility], given there was no active bleeding, with recommendations to transfuse and they will follow-up with the patient outpatient    However and patient is now transition to comfort care.    Iron deficiency anemia due to chronic blood loss  Assessment & Plan  History of was formally on iron oral supplementation    Recurrent falls  Assessment & Plan  Patient noted progressive bilateral lower extremity weakness for the past couple weeks, with recurrent falls over the past couple days  CT imaging and MRI imaging noted compression fracture with some moderate stenosis, but no source for the patient's weakness  Case was discussed with neurosurgery -with no recommendations for surgical intervention  Case was discussed with neurology -no concerns on imaging for transverse myelitis -did not recommend any other  neurological work-up    Abdominal aortic aneurysm (AAA) without rupture (HCC)  Assessment & Plan  Noted on imaging at 3.9CM    Paroxysmal atrial fibrillation (HCC)  Assessment & Plan  Not on anticoagulation    CKD stage G3b/A1, 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 albumin creatinine ratio <30 mg/g (HCC)- (present on admission)  Assessment & Plan  Chronic, stable    HTN (hypertension)- (present on admission)  Assessment & Plan  Patient transition to comfort care    Weakness of both legs- (present on admission)  Assessment & Plan  Patient noted progressive bilateral lower extremity weakness for the past couple weeks, with recurrent falls over the past couple days  CT imaging and MRI imaging noted compression fracture with some moderate stenosis, but no source for the patient's weakness  Case was discussed with neurosurgery -with no recommendations for surgical intervention  Case was discussed with neurology -no concerns on imaging for transverse myelitis -did not recommend any other neurological work-up      VTE prophylaxis: SCDs/TEDs

## 2022-07-27 NOTE — ED NOTES
"Pt on assessment cant move or feel his legs. RN called son enedina and asked about pt status enedina states \"his legs have been progressively getting worse and losing feeling, also his dementia is getting worse\". Pt is at baseline mentation per son.   "

## 2022-07-27 NOTE — PROGRESS NOTES
4 Eyes Skin Assessment Completed by ALTON somers and ALTON viera.    Head Bruising  Ears WDL  Nose WDL  Mouth WDL  Neck WDL  Breast/Chest Edema  Shoulder Blades WDL  Spine WDL  (R) Arm/Elbow/Hand Bruising, skin tear/abrasion  (L) Arm/Elbow/Hand Bruising  Abdomen WDL  Groin WDL  Scrotum/Coccyx/Buttocks Redness and Blanching  (R) Leg Bruising  (L) Leg Bruising  (R) Heel/Foot/Toe Blanching, Discoloration, Boggy and Bruising  (L) Heel/Foot/Toe Blanching, Discoloration, Boggy and Bruising          Devices In Places Blood Pressure Cuff and Pulse Ox      Interventions In Place Heel Float Boots, Q2 Turns, Barrier Cream and Dri-Neftaly Pads    Possible Skin Injury No    Pictures Uploaded Into Epic N/A  Wound Consult Placed N/A  RN Wound Prevention Protocol Ordered Yes

## 2022-07-27 NOTE — ED TRIAGE NOTES
Chief Complaint   Patient presents with   • GLF     Pt bib ems for glf at home in his bathroom pt complaining of back pain. No head trauma, -thinners, -loc, no midline neck tenderness

## 2022-07-27 NOTE — ED NOTES
Received report from Raphael DANG  Pt awake, family at bedside  Pt unable to move bilateral feet, noted redness to left forehead symptoms from fall today. Advised pt need to lay on his back for spine precaution, assisted with positioning.   Called CT, taken patient to ct for scan.

## 2022-07-27 NOTE — ED NOTES
Pharmacy Medication Reconciliation      ~Medication reconciliation updated and complete per patient home pharmacy  ~No oral ABX within the last 30 days  ~Patient home pharmacy: [facility]

## 2022-07-27 NOTE — ED PROVIDER NOTES
ED Provider Note    ED Provider Note    Primary care provider: Sydney Partida M.D.  Means of arrival: EMS  History obtained from: Patient    CHIEF COMPLAINT  Chief Complaint   Patient presents with   • GLF     Pt bib ems for glf at home in his bathroom pt complaining of back pain. No head trauma, -thinners, -loc, no midline neck tenderness     Seen at 5:56 AM.   HPI  Omar Bryant is a 89 y.o. male who presents to the Emergency Department after a fall.  The patient does not appear to be the best historian.  He states that he was here in the hospital[place] when he fell and since his fall he has had significant midline low back pain and cannot move nor feel his legs.    EMS reports that they picked him up from home where he fell in the bathroom.  There is no report of him having sensory deficits.  They report that he did not hit his head and the abrasion on his forehead is old.  I do not find the history from the patient reliable, the wife was contacted, he has had progressive weakness of the lower extremities and frequent falls at home, no reported sensory deficits.    Family was contacted, they report that he has had progressive weakness, predominantly sits in a chair throughout most the day, previously he was ambulating somewhat with a walker but for the most part is bedbound.  He went to [facility] in [facility] 3 weeks ago and they discharged him.  There was some discussion of rehab or SNF but he has not been placed.  He did fall today.  He does have some progressively worsening dementia.    REVIEW OF SYSTEMS  See HPI,   Remainder of ROS unreliable.     PAST MEDICAL HISTORY   has a past medical history of Arthritis, Cancer ([facility]) (2015), Dental disorder, Hypertension, Pain, Pneumonia (2017), Renal disorder, and Unspecified cataract.    SURGICAL HISTORY   has a past surgical history that includes thoracoscopy (Right, 7/22/2015); thoracotomy (Right, 7/22/2015); and other.    SOCIAL  "HISTORY  Social History     Tobacco Use   • Smoking status: Former Smoker     Packs/day: 0.25     Years: 60.00     Pack years: 15.00     Types: Cigarettes     Quit date: 2015     Years since quittin.1   • Smokeless tobacco: Never Used   Vaping Use   • Vaping Use: Never used   Substance Use Topics   • Alcohol use: No   • Drug use: No      Social History     Substance and Sexual Activity   Drug Use No       FAMILY HISTORY  History reviewed. No pertinent family history.    CURRENT MEDICATIONS  Reviewed.  See Encounter Summary.     ALLERGIES  No Known Allergies    PHYSICAL EXAM  VITAL SIGNS: BP (!) 168/75   Pulse 82   Temp 36.7 °C (98 °F) (Temporal)   Resp 19   Ht 1.803 m (5' 11\")   Wt 69.9 kg (154 lb)   SpO2 97%   BMI 21.48 kg/m²   Constitutional: Awake, alert in no apparent distress.  HENT: Normocephalic, bruising noted to the right upper forehead.  Bilateral external ears normal. Nose normal.  No midline neck tenderness.  Eyes: Conjunctiva normal, non-icteric, EOMI.    Thorax & Lungs: Easy unlabored respirations, Clear to ascultation bilaterally.  Cardiovascular: Regular rate, Regular rhythm, No murmurs, rubs or gallops. Bilateral pulses symmetrical.   Abdomen:  Soft, nontender, nondistended, normal active bowel sounds.   :    Skin: Visualized skin is  Dry, No erythema, No rash.   Musculoskeletal: No C-spine or T-spine tenderness, there does appear to be some L-spine tenderness at around the L2 level, no step-off.  The long bones were all palpated and found to be pain-free.  Pelvis is stable.    Neurologic: Alert, good strength upper extremities, sensation is normal down to the level of the iliac crests, after this he feels no sensation inferiorly.  Decreased rectal tone.  Patient makes no attempt to move the lower extremities.  Psychiatric: Normal affect, Normal mood  Lymphatic:  No cervical LAD      RADIOLOGY  MR-LUMBAR SPINE-W/O   Final Result      1.  Acute L1 superior endplate compression " fracture with approximately 30-35% loss of height. No significant bony retropulsion or ligamentous injury. No epidural hematoma.   2.  L3-S1 laminectomies.   3.  Multilevel degenerative changes as detailed above. Mild spinal stenosis L2-L3.   4.  L4-L5 diffuse disc osteophyte with prominent posterior left bulge/protrusion producing left lateral recess stenosis which may involve the traversing left L5 nerve. Moderate to severe left foraminal stenosis.   5.  Partially visualized abdominal aortic aneurysm. There is ectasia and abnormal signal within the iliac arteries, with occlusion not excluded. CT angiogram is suggested to further evaluate as clinically indicated.         CT-LSPINE W/O PLUS RECONS   Final Result      1. Acute fracture of the superior endplate of the L1 vertebral body with 24% loss of vertebral body height anteriorly. No retropulsion of fracture fragments.   2. Old fracture of the posterior right 12th rib.   3. Progressive degenerative disc disease at L4-5.   4. Incompletely imaged aneurysmal dilation of the infrarenal abdominal aorta, measuring up to 3.9 cm in diameter.      CT-TSPINE W/O PLUS RECONS   Final Result      1. Acute superior endplate compression fracture of the L1 vertebral body with 24% loss of vertebral body height anteriorly. No retropulsion of fracture fragments.   2. Healing fracture of the posterior right 12th rib.   3. No acute posttraumatic findings involving the thoracic vertebral bodies.      CT-CSPINE WITHOUT PLUS RECONS   Final Result         1.  Multilevel degenerative changes of the cervical spine limit diagnostic sensitivity of this examination   2.  5 mm anterolisthesis C7 on T1, appears likely secondary to associated severe facet arthrosis, traumatic listhesis could have radiographically similar appearance, otherwise no acute traumatic bony injury of the cervical spine is apparent.   3.  Atherosclerosis      CT-HEAD W/O   Final Result         1.  No acute intracranial  abnormality is identified, there are nonspecific white matter changes, commonly associated with small vessel ischemic disease.  Associated mild cerebral atrophy is noted.   2.  Atherosclerosis.               COURSE & MEDICAL DECISION MAKING  Pertinent Labs & Imaging studies reviewed. (See chart for details)    Differential diagnoses include but are not limited to: Compression fracture, unstable fracture, cauda equina    5:56 AM - Medical record reviewed, no recent visits.  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     6:05 AM called CT to get him imaging in an expeditious fashion.  Once they finished up with a trauma green they will come evaluate the patient.    6:10 AM CT tech at bedside.  The patient states he is in too much pain to be moved to the CT scanner.  IV and morphine will be initiated at this time.    7:34 AM: CT notified that the patient is ready, Ching will take the patient directly to CT now.    8:41 AM: Patient reexamined, he still has no movement, no sensation of the bilateral lower extremities.  Bladder scan only revealed 70 cc of urine.  CT shows acute fracture of L1 vertebral body with 24% loss of height.  Neurosurgery will be paged at this time.  Likely will require MRI.    8:58 AM: Discussed with Dr. Poole, neurosurgery.  He would agree that this is fairly atypical for compression fracture.  Recommends MR.    11 AM MRI of the L-spine does not show any obvious cause for the patient's symptoms.  The patient is reassessed, he continues to have no strength and no sensation of the lower extremities.  He is also significantly anemic.  FOBT positive, control acceptable.    11:45 AM Case discussed with the hospitalist, Dr. Clay who will evaluate the patient.    2:28 PM: Patient reassessed, he still has no sensation or movement of the lower extremities.  On further examination he does have cool bilateral lower  extremities.  He does have a known abdominal aneurysm, will image the abdomen with runoff to assess for ischemia.      Decision Making:  This is a pleasant 89 y.o. year old male who presents with a chief complaint of back pain and weakness.  Apparently the patient has had progressively worsening weakness over the past several months and increasing falls.  He has not had any sensory deficits until today.  The patient had pain in the lumbar spine.  I image the head through pelvis with CT to look for any acute fracture.  There was an acute endplate fracture of L1.  I discussed the case with Dr. Poole, neurosurgery.  His symptomatology is not consistent with a acute compression fracture but recommends MRI.  Because the patient did have a new fracture, I did focus the MRI on the lumbar spine which does show significant DJD and some stenosis but nothing that would attribute to the patient's neurologic exam.    Is difficult to evaluate for rectal tone, he is 89 years old and this is frequently decreased on elderly individuals.  In addition I cannot evaluate for urinary retention as the patient is incontinent at baseline and generally slowly leaks urine, therefore he would not get the distention as normally seen on a neurogenic bladder.  Bladder scan was done that only showed about 70 cc of urine.    Laboratory evaluation today also shows a significant anemia.  The patient is hemodynamically stable but fecal occult positive.  Possibly a small upper GI bleed.  Protonix was initiated in the ER, he will need at least 1 unit of PRBCs.    Another incidental finding today is the patient's aneurysm which is known from prior imaging.  There is question of ectasia in the iliac arteries.  CT of the abdomen with runoff ordered at this time.  Hospitalist aware.    Patient will be admitted in guarded condition.    CRITICAL CARE  The very real possibilty of a deterioration of this patient's condition required the highest level of my  preparedness for sudden, emergent intervention.  I provided critical care services, which included medication orders, frequent reevaluations of the patient's condition and response to treatment, ordering and reviewing test results, and discussing the case with various consultants.  The critical care time associated with the care of the patient was 35 minutes. Review chart for interventions. This time is exclusive of any other billable procedures.       FINAL IMPRESSION  1. Fall, initial encounter    2. Weakness of both lower extremities    3. Lower extremity numbness    4. Fecal occult blood test positive    5. Anemia, unspecified type

## 2022-07-28 NOTE — ASSESSMENT & PLAN NOTE
History of  Spoke with [facility], given there was no active bleeding, with recommendations to transfuse and they will follow-up with the patient outpatient    However and patient is now transition to comfort care.

## 2022-07-28 NOTE — CARE PLAN
The patient is Unstable - High likelihood or risk of patient condition declining or worsening, pt on comfort care    Shift Goals  Clinical Goals: Pain management  Patient Goals: Comfort  Family Goals: Pain management    Progress made toward(s) clinical / shift goals:      Problem: Pain - Standard  Goal: Alleviation of pain or a reduction in pain to the patient’s comfort goal  Outcome: Progressing  Patient reported back pain, medicated per MAR.      Problem: Knowledge Deficit - Standard  Goal: Patient and family/care givers will demonstrate understanding of plan of care, disease process/condition, diagnostic tests and medications  Outcome: Progressing  Patient's family at bedside, and being educated on the plan of care.      Problem: Fall Risk  Goal: Patient will remain free from falls  Outcome: Progressing   Patient's bed is locked and in the lowest position. Call light within reach.     Patient is not progressing towards the following goals:

## 2022-07-28 NOTE — DISCHARGE SUMMARY
Death Summary    Cause of Death  Likely hypovolemic shock secondary to bilateral lower extremity ischemia.    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 *      History of Presenting Illness and Hospital Course  This is a 89-year-old male with past medical history of atrial fibrillation, hyperlipidemia, CKD3b,  tobacco use disorder, history of RUL lung cancer s/p surgery 2015 with local recurrence treated with salvage SBRT in 2017, and abdominal aorta aneurysm 3.9cm, who was admitted on 07/27/2022 after sustaining a GLF with progressive BLE weakness x 3-4 weeks.      As per ERP, patient has been having progressive weakness over the past couple of weeks, patient formally ambulated with a walker but has now mostly been bedbound or sitting in a chair.  Patient was hospitalized in outside facility 3 weeks ago and was discharged home.     CT imaging of the lumbar spine noted L1 vertebral body loss with interval percent loss of height and acute fracture.  MRI imaging noted compression fracture some moderate stenosis but no findings consistent with the patient's presentation.     I discussed the case with neurology and neurosurgery, which had no further recommendations for any further work-up or imaging on their part.     I discussed the case with GI, given patient has history of angiodysplasia of the stomach and duodenum admitted  with a hemoglobin of 6 - recommendations to transfusion and they will follow-up with the patient outpatient.     MRI imaging did note possible occlusion in the iliac arteries.  Patient later developed bilateral lower extremity coolness noted by ERP, paleness, and inability to palpate any pulses from the inguinal area or bilateral feet.  Plans were to order a CTA with runoff, discussed the case with vascular surgery first and given patient's current state with hemoglobin of 6, age of 89, high risk of deterioration, and overall poor prognosis -he is not an ideal surgical candidate.     Discussed with the patient, patient's wife and patient's son Reagan - who are in agreement the patient should be transitioned to comfort care/hospice.  Which they prefer to be at a facility if possible. Hospice referral placed.       Death Date: 07/28/22   Death Time: 0030         Pronounced By (RN1): Cristal Henry RN  Pronounced By (RN2): Kalpana Louise RN

## 2022-07-28 NOTE — ASSESSMENT & PLAN NOTE
Bilateral  Patient admitted to progressive overall worsening weakness for the past 4 weeks, with recurrent falls over the past couple days.  Throughout the admission, patient started to have bilateral lower extremity cool limbs, unable to palpate pulses, no audible pulses in iliac region bilaterally.   Given history of abdominal aneurysm, hemoglobin of 6, patient's age, concern for possible blockage now with BLE ischemia   Discussed case with vascular surgery, patient is a poor surgical candidate and high risk of deterioration - refrained from ordering CTA runoff - until goals of care conversation with family  Discussed with the patient, patient's wife and patient's son Reagan -who are in agreement the patient should be transition to comfort care/hospice   Hospice referral placed.

## 2022-07-28 NOTE — ASSESSMENT & PLAN NOTE
Patient noted progressive bilateral lower extremity weakness for the past couple weeks, with recurrent falls over the past couple days  CT imaging and MRI imaging noted compression fracture with some moderate stenosis, but no source for the patient's weakness  Case was discussed with neurosurgery -with no recommendations for surgical intervention  Case was discussed with neurology -no concerns on imaging for transverse myelitis -did not recommend any other neurological work-up

## 2022-07-28 NOTE — PROGRESS NOTES
Patient noted to be . 2 RN pronounce at 0030. Che at bedside during patient expiration. Belongings (t-shirt, sweat pants, drivers licence) sent home with che.

## 2022-07-28 NOTE — PROGRESS NOTES
Assumed patient care at 1900. Patient responds to voice, and is oriented to person. Disoriented to time, situation, and place .Bed in lowest position and locked. Call light within reach and bed alarm is set. Hourly rounding continues.

## 2022-07-28 NOTE — ASSESSMENT & PLAN NOTE
Given history of abdominal aneurysm, hemoglobin of 6, patient's age, concern for possible blockage now with BLE ischemia   Discussed case with vascular surgery, patient is a poor surgical candidate and high risk of deterioration.  Discussed with the patient, patient's wife and patient's son Reagan - who are in agreement the patient should be transition to comfort care/hospice   Hospice referral placed.

## 2022-07-28 NOTE — CONSULTS
ID:  Omar Bryant; 89 y.o. male      Admission Date: 2022   Date of Consultation: 2022  Requesting Provider: Kenya Clay D.O.  PCP: Sydney Partida M.D.        Chief Complaint:: fall    Reason for consultation:: L2 compression fracture      HPI:  Omar Bryant is a 89 y.o. male who presented to [facility] after ground level fall. Pt with 3-4 week history of b/l LE weakness. Reportedly fell 3 days ago and has not been able to move his legs since then. Denies sensation below hips. Endorses saddle sensation and ability to urinate.        Past Medical History:  Past Medical History:   Diagnosis Date   • Arthritis     hips   • Cancer (HCC)     lung- radiation and surgery   • Dental disorder     full dentures   • Hypertension    • Pain     back, hips, legs   • Pneumonia    • Renal disorder     CKD stage 3   • Unspecified cataract     bilateral IOL       Past Surgical History:  Past Surgical History:   Procedure Laterality Date   • THORACOSCOPY Right 2015    Procedure: THORACOSCOPY WEDGE RESECTION UPPER LOBE LUNG NODULE, ADHESION OF LYSIS;  Surgeon: Yobany Guerra M.D.;  Location: SURGERY [facility];  Service:    • THORACOTOMY Right 2015    Procedure: THORACOTOMY With Wedge Resection upper Lobe Lung Nodule, Adhesion of Lysis;  Surgeon: Yobany Guerra M.D.;  Location: SURGERY [facility];  Service:    • OTHER      mayco cataracts       Social History:  Social History     Occupational History   • Not on file   Tobacco Use   • Smoking status: Former Smoker     Packs/day: 0.25     Years: 60.00     Pack years: 15.00     Types: Cigarettes     Quit date: 2015     Years since quittin.1   • Smokeless tobacco: Never Used   Vaping Use   • Vaping Use: Never used   Substance and Sexual Activity   • Alcohol use: No   • Drug use: No   • Sexual activity: Not on file     Social History     Social History Narrative   • Not on file       Family  History:  History reviewed. No pertinent family history.    Medications:  Prior to Admission medications    Medication Sig Start Date End Date Taking? Authorizing Provider   metoprolol tartrate (LOPRESSOR) 25 MG Tab Take 12.5 mg by mouth 2 times a day.   Yes Physician Outpatient   ferrous sulfate 325 (65 Fe) MG tablet Take 325 mg by mouth every day.    Physician Outpatient   furosemide (LASIX) 20 MG Tab Take 20 mg by mouth every day.    Physician Outpatient   pantoprazole (PROTONIX) 40 MG Tablet Delayed Response Take 40 Tablets by mouth every day.    Physician Outpatient   ropinirole (REQUIP) 0.5 MG TABS Take 0.5 mg by mouth at bedtime.      Nn Emergency Md Per Protocol, [facility].       Allergies:  No Known Allergies    Review of Systems:    negative for constitutional, HEENT, cardiac, pulmonary, GI, , musculoskeletal, psych, dermatologic, endo, hematologic, immunologic except: as reviewed in HPI              PHYSICAL  EXAMINATION:   A/O x 4, NAD, normal affect  Non-labored respiration, symmetrical chest rise  CN 2-12 grossly intact      Motor Exam (0-5/5, N/T)  STRENGTH R L   Deltoid  5 5   Biceps 5 5   Triceps 5 5   Wrist Dorsiflexors 5 5   Finger Abductor 5 5    5 5   Iliopsoas 0 0   Quadriceps 0 0   Hamstrings 0 0   Ankle dorsiflexor 0 0   Ankle plantarflexor 0 0   Extensor hallucis 0 0       Dermatomal Deficit: T12 senory level    REFLEXES R L   Biceps 2 2   Brachiorad. 2 2   Triceps 2 2   Patella 0 0   Ankle 0 0         No hoffmans  No clonus  No babinski  Negative Rombergs sign    Muscle appearance: Symmetric and normal appearing bulk, contour and tone.    Spastic paralysis in lower extremities. Legs cool to touch      LABS:  Recent Labs     07/27/22  0852   SODIUM 140   POTASSIUM 3.6   CHLORIDE 109   CO2 19*   GLUCOSE 117*   BUN 27*   CREATININE 1.65*   CALCIUM 8.4*     Recent Labs     07/27/22  0852   WBC 6.0   RBC 2.09*   HEMOGLOBIN 6.4*   HEMATOCRIT 19.7*   MCV 94.3   MCH 30.6   MCHC 32.5*   RDW 51.6*    PLATELETCT 116*   MPV 9.7     Lab Results   Component Value Date/Time    PROTHROMBTM 13.8 11/30/2018 12:00 PM    INR 1.04 11/30/2018 12:00 PM      Recent Labs     07/27/22  0852   ASTSGOT 15   ALTSGPT 11   TBILIRUBIN 0.5   ALKPHOSPHAT 73   GLOBULIN 3.1       Radiology Studies:     CT and Mri demonstrates L2 compression with about 30% loss of height. No compressive lesion    Impression and Plan:     Mr. Omar Bryant is a 89 y.o. year old male presenting with b/l LE paralysis with L2 fracture     - There is no stenosis secondary to fracture. This is not the cause of the LE paralysis  - TLSO for fracture  - neurology consult for LE paralysis    Thank you for the consultation. Please do not hesitate contacting me with questions.    Eduardo Poole III, MD, PhD  Board eligible neurosurgeon  [facility]